# Patient Record
Sex: FEMALE | Race: BLACK OR AFRICAN AMERICAN | NOT HISPANIC OR LATINO | Employment: FULL TIME | URBAN - METROPOLITAN AREA
[De-identification: names, ages, dates, MRNs, and addresses within clinical notes are randomized per-mention and may not be internally consistent; named-entity substitution may affect disease eponyms.]

---

## 2017-01-16 ENCOUNTER — HOSPITAL ENCOUNTER (EMERGENCY)
Facility: OTHER | Age: 27
Discharge: HOME | End: 2017-01-16
Payer: MEDICAID

## 2017-01-16 VITALS
OXYGEN SATURATION: 100 % | SYSTOLIC BLOOD PRESSURE: 114 MMHG | DIASTOLIC BLOOD PRESSURE: 70 MMHG | TEMPERATURE: 99 F | HEART RATE: 68 BPM | RESPIRATION RATE: 18 BRPM

## 2017-01-16 LAB
BASOPHILS # BLD AUTO: 0.01 K/UL
BASOPHILS NFR BLD: 0.2 %
DIFFERENTIAL METHOD: ABNORMAL
EOSINOPHIL # BLD AUTO: 0.1 K/UL
EOSINOPHIL NFR BLD: 1.6 %
ERYTHROCYTE [DISTWIDTH] IN BLOOD BY AUTOMATED COUNT: 12.3 %
HCT VFR BLD AUTO: 37.6 %
HGB BLD-MCNC: 12.1 G/DL
LYMPHOCYTES # BLD AUTO: 3 K/UL
LYMPHOCYTES NFR BLD: 48.2 %
MCH RBC QN AUTO: 27.9 PG
MCHC RBC AUTO-ENTMCNC: 32.2 %
MCV RBC AUTO: 87 FL
MONOCYTES # BLD AUTO: 0.4 K/UL
MONOCYTES NFR BLD: 6 %
NEUTROPHILS # BLD AUTO: 2.7 K/UL
NEUTROPHILS NFR BLD: 43.8 %
PLATELET # BLD AUTO: 237 K/UL
PMV BLD AUTO: 9.4 FL
RBC # BLD AUTO: 4.33 M/UL
WBC # BLD AUTO: 6.18 K/UL

## 2017-01-16 PROCEDURE — 99283 EMERGENCY DEPT VISIT LOW MDM: CPT | Mod: ,,, | Performed by: OBSTETRICS & GYNECOLOGY

## 2017-01-16 PROCEDURE — 85025 COMPLETE CBC W/AUTO DIFF WBC: CPT

## 2017-01-16 PROCEDURE — 99283 EMERGENCY DEPT VISIT LOW MDM: CPT

## 2017-01-16 NOTE — ED AVS SNAPSHOT
[facility]  [address]  [facility] [address]               Lurdes KASIA Shay   2017  6:07 PM   ED    Description:  Female : 1990   Department:  [facility]           Your Care was Coordinated By:     None      Reason for Visit     Vaginal Bleeding           ED Disposition     ED Disposition Condition Comment    Discharge             To Do List           [facility]     [facility] Care Line -  Assistance  Registered nurses in the [facility] [place] provide clinical advisement, health education, appointment booking, and other advisory services.  Call for this free service at [phone].             Medications           Message regarding Medications     Verify the changes and/or additions to your medication regime listed below are the same as discussed with your clinician today.  If any of these changes or additions are incorrect, please notify your healthcare provider.             Verify that the below list of medications is an accurate representation of the medications you are currently taking.  If none reported, the list may be blank. If incorrect, please contact your healthcare provider. Carry this list with you in case of emergency.           Current Medications     acetaminophen (TYLENOL) 325 MG tablet Take 325 mg by mouth every 6 (six) hours as needed for Pain.    butalbital-acetaminophen-caffeine -40 mg (FIORICET) -40 mg per tablet Take 1-2 tablets by mouth every 6 (six) hours as needed for Headaches.    ibuprofen (ADVIL,MOTRIN) 600 MG tablet Take 1 tablet (600 mg total) by mouth every 6 (six) hours as needed (cramping).    norgestimate-ethinyl estradiol (ORTHO-CYCLEN) 0.25-35 mg-mcg per tablet Take 1 tablet by mouth once daily.    oxycodone-acetaminophen (PERCOCET) 5-325 mg per tablet Take 1 tablet by mouth every 4 (four) hours as needed.    prenatal vit#10-iron-FA-omega3 30-1-310.1 mg Cap Take 1  tablet by mouth once daily.           Clinical Reference Information           Your Vitals Were     BP Pulse Temp Resp SpO2       114/70 68 98.6 °F (37 °C) 18 100%       Allergies as of 1/16/2017     No Known Allergies      Immunizations Administered on Date of Encounter - 1/16/2017     None      ED Micro, Lab, POCT     Start Ordered       Status Ordering Provider    01/16/17 1856 01/16/17 1855  CBC auto differential  STAT      In process       ED Imaging Orders     None        Discharge Instructions       Monitor bleeding if saturating more that one pad per hour please contact physician.      [facility] complies with applicable Federal civil rights laws and does not discriminate on the basis of race, color, national origin, age, disability, or sex.        Language Assistance Services     ATTENTION: Language assistance services are available, free of charge. Please call [phone].      ATENCIÓN: Si habla español, tiene a damian disposición servicios gratuitos de asistencia lingüística. Llame al [phone].     CHÚ Ý: N?u b?n nói Ti?ng Vi?t, có các d?ch v? h? tr? ngôn ng? mi?n phí dành cho b?n. G?i s? [phone].

## 2017-01-17 NOTE — ED NOTES
Pt 5 weeks pp vag delivery  States Thursday she thought she started her period but was passing large clots today.  Denies pain, cramping or dizziness.  Dr Vazquez notified

## 2017-01-17 NOTE — ED NOTES
Lab results reviewed with Dr. Vazquez at this time. Discussed with pt and discharge insstructions reviewed.

## 2017-01-17 NOTE — ED PROVIDER NOTES
Encounter Date: 2017       History     Chief Complaint   Patient presents with    Vaginal Bleeding     Review of patient's allergies indicates:  No Known Allergies  HPI Comments: Lurdes Day is a 26 y.o. [id number] 5 weeks s/p  presents complaining of vaginal bleeding. Patient reports postpartum bleeding resolved then resumed on Friday. She believes this is her period; however, states that it is heavier than her past periods. Reports at times soaking through a pad/hour. Reports passing clots 5cm clots. Denies breastfeeding.      The history is provided by the patient.     No past medical history on file.  Past Medical History Pertinent Negatives   Diagnosis Date Noted    Asthma 10/26/2014    Coronary artery disease 2013    Diabetes mellitus 2013    Hypertension 2013     Past Surgical History   Procedure Laterality Date    Hernia repair       Family History   Problem Relation Age of Onset    Colon cancer Neg Hx     Hypertension Neg Hx      Social History   Substance Use Topics    Smoking status: Never Smoker    Smokeless tobacco: Never Used    Alcohol use No     Review of Systems   Constitutional: Negative for fever.   HENT: Negative for sore throat.    Respiratory: Negative for shortness of breath.    Cardiovascular: Negative for chest pain.   Gastrointestinal: Negative for nausea.   Genitourinary: Negative for dysuria.   Musculoskeletal: Negative for back pain.   Skin: Negative for rash.   Neurological: Negative for weakness.   Hematological: Does not bruise/bleed easily.       Physical Exam   Initial Vitals   BP Pulse Resp Temp SpO2   17 1810 17 1810 17 1807 17 1810 17 1810   128/85 77 18 98.6 °F (37 °C) 99 %     Physical Exam    Constitutional: She appears well-developed and well-nourished. No distress.   HENT:   Head: Normocephalic and atraumatic.   Cardiovascular: Normal rate and regular rhythm.   Pulmonary/Chest: No respiratory distress.    Neurological: She is alert and oriented to person, place, and time.   Psychiatric: She has a normal mood and affect.     OB LABOR EXAM:                     Comments: SSE: moderate blood in the vaginal vault, minimal active bleeding from the os  No fundal or adnexal tenderness on bimanual exam, normal size uterus  U/S: 6-7cm uterus, 1cm EMS, no products of conception visualized     Lab Results   Component Value Date    WBC 6.18 2017    HGB 12.1 2017    HCT 37.6 2017    MCV 87 2017     2017         ED Course   Procedures  Labs Reviewed - No data to display          Medical Decision Making:   Initial Assessment:   26 y.o. [id number] 5 weeks s/p  with normal postpartum vaginal bleeding  ED Management:  - SSE and bimanual exam as above  - Bedside ultrasound as above  - CBC: H/h 12.1/37.6   - F/u in 1 week with primary OB as scheduled  - Discharge home with reassurance and precautions                Attending Attestation:   Physician Attestation Statement for Resident:  As the supervising MD   Physician Attestation Statement: I have personally seen and examined this patient.   I agree with the above history. -:   As the supervising MD I agree with the above PE.    As the supervising MD I agree with the above treatment, course, plan, and disposition.   -: Agree with plan for discharge after reassurance for postpartum vaginal bleeding.  I was personally present during the critical portions of the procedure(s) performed by the resident and was immediately available in the ED to provide services and assistance as needed during the entire procedure.  I have reviewed and agree with the residents interpretation of the following: lab data.                    ED Course     Clinical Impression:   Normal postpartum bleeding        Brianne Hayes MD  [phone]

## 2017-09-16 ENCOUNTER — HOSPITAL ENCOUNTER (EMERGENCY)
Facility: OTHER | Age: 27
Discharge: HOME | End: 2017-09-16
Attending: EMERGENCY MEDICINE
Payer: MEDICAID

## 2017-09-16 VITALS
SYSTOLIC BLOOD PRESSURE: 122 MMHG | HEIGHT: 66 IN | RESPIRATION RATE: 18 BRPM | WEIGHT: 145 LBS | BODY MASS INDEX: 23.3 KG/M2 | TEMPERATURE: 99 F | OXYGEN SATURATION: 100 % | HEART RATE: 92 BPM | DIASTOLIC BLOOD PRESSURE: 84 MMHG

## 2017-09-16 DIAGNOSIS — J06.9 UPPER RESPIRATORY TRACT INFECTION, UNSPECIFIED TYPE: ICD-10-CM

## 2017-09-16 DIAGNOSIS — H10.9 CONJUNCTIVITIS OF LEFT EYE, UNSPECIFIED CONJUNCTIVITIS TYPE: Primary | ICD-10-CM

## 2017-09-16 DIAGNOSIS — Z34.90 PREGNANCY, UNSPECIFIED GESTATIONAL AGE: ICD-10-CM

## 2017-09-16 LAB
B-HCG UR QL: POSITIVE
CTP QC/QA: YES
CTP QC/QA: YES
S PYO RRNA THROAT QL PROBE: NEGATIVE

## 2017-09-16 PROCEDURE — 87880 STREP A ASSAY W/OPTIC: CPT

## 2017-09-16 PROCEDURE — 99283 EMERGENCY DEPT VISIT LOW MDM: CPT | Mod: 25

## 2017-09-16 PROCEDURE — 81025 URINE PREGNANCY TEST: CPT | Performed by: NURSE PRACTITIONER

## 2017-09-16 RX ORDER — ERYTHROMYCIN 5 MG/G
OINTMENT OPHTHALMIC
Qty: 3.5 G | Refills: 0 | Status: SHIPPED | OUTPATIENT
Start: 2017-09-16 | End: 2018-02-16

## 2017-09-16 NOTE — ED PROVIDER NOTES
Encounter Date: 9/16/2017       History     Chief Complaint   Patient presents with    Conjunctivitis     redness and drainage to left eye x 4 days    Sore Throat     x 3 days     A 26-year-old female who presents to the ED with left eye redness and drainage ×4 days.  Patient denies visual changes.  States she has been in contact with her son who has pinkeye.  Patient reports sore throat for 4 days and nasal congestion with a mild cough.  Patient denies fever, chills nausea vomiting.  Patient states she is late on her menstrual period and does not know if she is pregnant.  Patient denies abdominal pain or vaginal discharge. LMP around 08/09/2017.          Review of patient's allergies indicates:  No Known Allergies  History reviewed. No pertinent past medical history.  Past Surgical History:   Procedure Laterality Date    HERNIA REPAIR       Family History   Problem Relation Age of Onset    Colon cancer Neg Hx     Hypertension Neg Hx      Social History   Substance Use Topics    Smoking status: Never Smoker    Smokeless tobacco: Never Used    Alcohol use No     Review of Systems   Constitutional: Negative.  Negative for activity change, appetite change and fever.   HENT: Positive for congestion and sore throat. Negative for ear discharge and ear pain.    Eyes: Positive for discharge and redness.   Respiratory: Positive for cough. Negative for chest tightness.    Cardiovascular: Negative for chest pain and palpitations.   Gastrointestinal: Negative.  Negative for abdominal distention, abdominal pain and nausea.   Genitourinary: Negative.  Negative for difficulty urinating, dyspareunia and dysuria.   Musculoskeletal: Negative.  Negative for arthralgias, back pain, neck pain and neck stiffness.   Skin: Negative.  Negative for color change.   Neurological: Negative for dizziness, facial asymmetry and weakness.   Psychiatric/Behavioral: Negative.  Negative for agitation and sleep disturbance.   All other systems  reviewed and are negative.      Physical Exam     Initial Vitals   BP Pulse Resp Temp SpO2   09/16/17 1114 09/16/17 1114 09/16/17 1114 09/16/17 1115 09/16/17 1114   124/81 88 16 99.4 °F (37.4 °C) 99 %      MAP       09/16/17 1114       95.33         Physical Exam    Nursing note and vitals reviewed.  Constitutional: Vital signs are normal. She appears well-developed and well-nourished.  Non-toxic appearance. She does not have a sickly appearance.   HENT:   Head: Normocephalic.   Right Ear: Tympanic membrane and external ear normal.   Left Ear: Tympanic membrane and external ear normal.   Nose: Nose normal.   Mouth/Throat: Uvula is midline and mucous membranes are normal. Posterior oropharyngeal erythema present. No tonsillar abscesses.   Eyes: EOM and lids are normal. Pupils are equal, round, and reactive to light. Left conjunctiva is injected.   No drainage noted    Cardiovascular: Normal rate and regular rhythm.   Pulmonary/Chest: Effort normal and breath sounds normal.   Abdominal: Bowel sounds are normal. There is no CVA tenderness.   Musculoskeletal:   Upper and lower bilateral extremity with full range of motion.   Neurological: She is alert. She has normal strength.   Psychiatric: She has a normal mood and affect. Her speech is normal and behavior is normal. Thought content normal.         ED Course   Procedures  Labs Reviewed   POCT URINE PREGNANCY - Abnormal; Notable for the following:        Result Value    POC Preg Test, Ur Positive (*)     All other components within normal limits   POCT RAPID STREP A             Medical Decision Making:   Initial Assessment:   A 26-year-old nontoxic female who presents to the ED with complaints of left eye redness ×4 days.  Patient has been in contact with her son who has conjunctivitis.  Patient reports upper respiratory symptoms at up as 4 days.  Patient unsure if she is pregnant.  Denies abdominal pain.  She states she has not had a period in almost 2 months.  She  did not take a home pregnancy test because she is not sure if she wants to carry to baby to full-term.  Differential Diagnosis:   Conjunctivitis, upper respiratory infection, strep pharyngitis.  Clinical Tests:   Lab Tests: Ordered and Reviewed  ED Management:  Physical exam, strep swab-negative.  Urine pregnancy test positive.  Patient denies abdominal pain and vaginal bleeding.  Patient to be discharged home with erythromycin ophthalmic ointment.  Patient to follow up with PCP in 2-3 days.  Patient referred to me for follow-up.  Patient instructed to return to ED if symptoms worsen or for abdominal pain or vaginal bleeding.              Attending Attestation:     Physician Attestation Statement for NP/PA:   I discussed this assessment and plan of this patient with the NP/PA, but I did not personally examine the patient. The face to face encounter was performed by the NP/PA.                  ED Course      Clinical Impression:   The primary encounter diagnosis was Conjunctivitis of left eye, unspecified conjunctivitis type. Diagnoses of Upper respiratory tract infection, unspecified type and Pregnancy, unspecified gestational age were also pertinent to this visit.                           ANISA Goodrich  09/16/17 8806       Isabel Koroma MD  09/16/17 3017

## 2017-09-16 NOTE — DISCHARGE INSTRUCTIONS
You have been diagnosed with an early pregnancy. Follow-up with OB in 3-5 days.  Return to ED for abdominal pain or vaginal bleeding.

## 2017-12-13 ENCOUNTER — TELEPHONE (OUTPATIENT)
Dept: OBSTETRICS AND GYNECOLOGY | Facility: CLINIC | Age: 27
End: 2017-12-13

## 2017-12-13 DIAGNOSIS — O09.33 LATE PRENATAL CARE AFFECTING PREGNANCY IN THIRD TRIMESTER: Primary | ICD-10-CM

## 2017-12-13 NOTE — TELEPHONE ENCOUNTER
----- Message from Vannesa Pillai sent at 12/13/2017  1:13 PM CST -----  Contact: pt  _ x 1st Request  _  2nd Request  _  3rd Request      Who:pt    Why: calling in regards to an routine appointment     What Number to Call Back: [phone]    When to Expect a call back: (Before the end of the day)   -- if call after 3:00 call back will be tomorrow.

## 2017-12-13 NOTE — TELEPHONE ENCOUNTER
Returned pt call to office about appointment. Patient was not available and a family member answered and took a message. Provided number to office [phone].

## 2017-12-13 NOTE — TELEPHONE ENCOUNTER
Spoke to patient. Patient states she is having breast tenderness and soreness. Patient is 7 months pregnant with no previous care in this pregnancy. Advised patient that breast soreness is normal at this stage of pregnancy. Scheduled new ob appt.

## 2017-12-13 NOTE — TELEPHONE ENCOUNTER
----- Message from Macey Biswas sent at 12/13/2017  2:22 PM CST -----  Contact: pt  x_ 1st Request  _ 2nd Request  _ 3rd Request    Who: pt    Why: in regards to experiencing swollen breast and tenderness    What Number to Call Back: [phone]    When to Expect a call back: (Before the end of the day)  -- if call after 3:00 call back will be tomorrow.

## 2017-12-29 ENCOUNTER — TELEPHONE (OUTPATIENT)
Dept: OBSTETRICS AND GYNECOLOGY | Facility: CLINIC | Age: 27
End: 2017-12-29

## 2017-12-29 NOTE — TELEPHONE ENCOUNTER
----- Message from Tonio Velasco sent at 12/29/2017 12:55 PM CST -----  Contact: patient  x_ 1st Request   _ 2nd Request   _ 3rd Request     Who: CARLOS VINSON [[id number]]    Why: Pt missed your call is requesting a call back    What Number to Call Back: [phone]    When to Expect a call back: (Before the end of the day)   -- if call after 3:00 call back will be tomorrow.

## 2017-12-29 NOTE — TELEPHONE ENCOUNTER
----- Message from Nia Horne sent at 12/29/2017  9:36 AM CST -----  Contact: Patient   x _1st Request  _  2nd Request  _  3rd Request    Who:CARLOS VINSON [[id number]]    Why:Patient is 28 weeks and she states no one advised her that her appointment  was canceled she took off from work today she is requesting a call back asap     What Number to Call Back:[phone]    When to Expect a call back: (Before the end of the day)   -- if call after 3:00 call back will be tomorrow.

## 2018-01-12 ENCOUNTER — TELEPHONE (OUTPATIENT)
Dept: OBSTETRICS AND GYNECOLOGY | Facility: CLINIC | Age: 28
End: 2018-01-12

## 2018-01-12 NOTE — TELEPHONE ENCOUNTER
Pt scheduled for 01/18/18 at [facility] with Dr Ochoa for routine Ob

## 2018-01-12 NOTE — TELEPHONE ENCOUNTER
----- Message from Vannesa Pillai sent at 1/12/2018  8:55 AM CST -----  Contact: pt  _x  1st Request  _  2nd Request  _  3rd Request      Who:pt    Why: calling in regards to her Routine appointment     What Number to Call Back: [phone]    When to Expect a call back: (Before the end of the day)   -- if call after 3:00 call back will be tomorrow.

## 2018-01-18 ENCOUNTER — PATIENT MESSAGE (OUTPATIENT)
Dept: OBSTETRICS AND GYNECOLOGY | Facility: CLINIC | Age: 28
End: 2018-01-18

## 2018-01-18 ENCOUNTER — ROUTINE PRENATAL (OUTPATIENT)
Dept: OBSTETRICS AND GYNECOLOGY | Facility: CLINIC | Age: 28
End: 2018-01-18
Attending: OBSTETRICS & GYNECOLOGY
Payer: MEDICAID

## 2018-01-18 VITALS — SYSTOLIC BLOOD PRESSURE: 112 MMHG | BODY MASS INDEX: 25.8 KG/M2 | DIASTOLIC BLOOD PRESSURE: 78 MMHG | WEIGHT: 159.81 LBS

## 2018-01-18 DIAGNOSIS — O09.33 LATE PRENATAL CARE AFFECTING PREGNANCY IN THIRD TRIMESTER: Primary | ICD-10-CM

## 2018-01-18 DIAGNOSIS — N89.8 VAGINAL ITCHING: ICD-10-CM

## 2018-01-18 PROCEDURE — 87081 CULTURE SCREEN ONLY: CPT

## 2018-01-18 PROCEDURE — 88175 CYTOPATH C/V AUTO FLUID REDO: CPT

## 2018-01-18 PROCEDURE — 87480 CANDIDA DNA DIR PROBE: CPT

## 2018-01-18 PROCEDURE — 99215 OFFICE O/P EST HI 40 MIN: CPT | Mod: 25,S$PBB,TH, | Performed by: OBSTETRICS & GYNECOLOGY

## 2018-01-18 PROCEDURE — 99213 OFFICE O/P EST LOW 20 MIN: CPT | Mod: PBBFAC,TH | Performed by: OBSTETRICS & GYNECOLOGY

## 2018-01-18 PROCEDURE — 87491 CHLMYD TRACH DNA AMP PROBE: CPT

## 2018-01-18 PROCEDURE — 99999 PR PBB SHADOW E&M-EST. PATIENT-LVL III: CPT | Mod: PBBFAC,,, | Performed by: OBSTETRICS & GYNECOLOGY

## 2018-01-18 NOTE — PROGRESS NOTES
HPI: Pt is a 27 y.o. female who presents complaining of missed menses and (+) home UPT. She denies vaginal bleeding or abdominal pain. She does not have nausea and vomiting. No prenatal care this pregnancy and believes her LMP to be 17 with first missed period in may.  States that she has not had time to come in.  History of  x 3, loss of 1 child to SIDS.      ROS:  GENERAL: Feeling well overall.   SKIN: Denies rash or lesions.   HEAD: Denies head injury or headache.   NODES: Denies enlarged lymph nodes.   CHEST: Denies chest pain or shortness of breath.   CARDIOVASCULAR: Denies palpitations or left sided chest pain.   ABDOMEN: No abdominal pain, constipation, diarrhea or rectal bleeding.   URINARY: No dysuria, hematuria, or burning on urination.  REPRODUCTIVE: See HPI.   BREASTS: Denies pain, lumps, or nipple discharge.   HEMATOLOGIC: No easy bruisability or excessive bleeding.   MUSCULOSKELETAL: Denies joint pain or swelling.   NEUROLOGIC: Denies syncope or weakness.   PSYCHIATRIC: Denies depression, anxiety or mood swings.    PE:   APPEARANCE: Well nourished, well developed, in no acute distress.  AFFECT: WNL, alert and oriented x 3.  SKIN: No acne or hirsutism.  NECK: Neck symmetric, without masses or thyromegaly.  NODES: No inguinal, cervical, axillary or femoral lymph node enlargement.  CHEST: Good respiratory effort.   ABDOMEN: Soft. No tenderness or masses. No hepatosplenomegaly. No hernias.  BREASTS: Symmetrical, no skin changes or visible lesions. No palpable masses, adenopathy, or nipple discharge bilaterally.  PELVIC: External female genitalia without lesions. Female hair distribution. Adequate perineal body, Normal urethral meatus. Vagina moist and well rugated without lesions or discharge. There is no significant cystocele or rectocele. Cervix pink without lesions, discharge or tenderness. Uterus is 33 week size, regular, mobile and nontender. Adnexa without masses.  EXTREMITIES: No  edema.    PROCEDURES:  - UPT: positive  - Urine dip: negative  - Dating U/S: to be scheduled with GynUS    GROWTH U/S: 31w4d with EDC of 3/18/18, MVP 3.8cm, placenta posterior      Diagnosis:  1. Late prenatal care affecting pregnancy in third trimester    2. Vaginal itching        Plan:     Orders Placed This Encounter    Urine culture    C. trachomatis/N. gonorrhoeae by AMP DNA Cervix    Strep B Screen, Vaginal / Rectal    Vaginosis Screen by DNA Probe    Tdap Vaccine    Basic metabolic panel    HIV-1 and HIV-2 antibodies    RPR    Hepatitis B surface antigen    Rubella antibody, IgG    CBC auto differential    OB Glucose Screen    Type & Screen - Ob Profile    Liquid-based pap smear, screening    US OB/GYN Procedure (Viewpoint)         Patient was counseled today on routine precautions, including vaginal bleeding and abdominal pain. We also discussed proper weight gain based on the [place] [employer] [facility]'s recommendations based on her pre-pregnancy weight, foods to avoid in pregnancy (i.e. sushi, fish that are high in mercury, cold deli meat, and unpasteurized cheeses), environmental precautions such as cat litter, and prenatal vitamin options (i.e. stool softener, DHA).     Third trimester precautions given for signs of labor, ROM, decreased fetal movement with instructions to come in if experiences any of these things.    All appointments scheduled for TDAP, labs, glucose screen and dating/ anatomy ordered with M.    Total face to face time spent with the patient was 40 minutes and over half spent in counseling on the above related issues.     Follow-up with me in 1 week for OB check

## 2018-01-19 ENCOUNTER — PATIENT MESSAGE (OUTPATIENT)
Dept: MATERNAL FETAL MEDICINE | Facility: CLINIC | Age: 28
End: 2018-01-19

## 2018-01-19 LAB
C TRACH DNA SPEC QL NAA+PROBE: NOT DETECTED
CANDIDA RRNA VAG QL PROBE: POSITIVE
G VAGINALIS RRNA GENITAL QL PROBE: NEGATIVE
N GONORRHOEA DNA SPEC QL NAA+PROBE: NOT DETECTED
T VAGINALIS RRNA GENITAL QL PROBE: NEGATIVE

## 2018-01-22 LAB — BACTERIA SPEC AEROBE CULT: NORMAL

## 2018-01-23 ENCOUNTER — PATIENT MESSAGE (OUTPATIENT)
Dept: OBSTETRICS AND GYNECOLOGY | Facility: CLINIC | Age: 28
End: 2018-01-23

## 2018-01-23 RX ORDER — TERCONAZOLE 4 MG/G
1 CREAM VAGINAL NIGHTLY
Qty: 45 G | Refills: 0 | Status: SHIPPED | OUTPATIENT
Start: 2018-01-23 | End: 2018-01-26 | Stop reason: SDUPTHER

## 2018-01-24 ENCOUNTER — TELEPHONE (OUTPATIENT)
Dept: OBSTETRICS AND GYNECOLOGY | Facility: HOSPITAL | Age: 28
End: 2018-01-24

## 2018-01-24 DIAGNOSIS — O09.30 LATE PRENATAL CARE: Primary | ICD-10-CM

## 2018-01-25 NOTE — TELEPHONE ENCOUNTER
----- Message from Nguyễn Stevens RN sent at 1/19/2018  8:08 AM CST -----  Calling pt now, can you enter [facility] u/s order - OB view 55  ----- Message -----  From: Sangita Ochoa DO  Sent: 1/18/2018   5:37 PM  To: MARCELINO Earl,   This patient just presented for her first prenatal visit at 38w by LMP but measuring 33 by fundal height and 31w4d by quick scan in the office.  Talked to Karla, he said try to get her in ASAP.  Thanks in advance for your help.  Axel Ochoa

## 2018-01-26 ENCOUNTER — ROUTINE PRENATAL (OUTPATIENT)
Dept: OBSTETRICS AND GYNECOLOGY | Facility: CLINIC | Age: 28
End: 2018-01-26
Attending: OBSTETRICS & GYNECOLOGY
Payer: MEDICAID

## 2018-01-26 ENCOUNTER — OFFICE VISIT (OUTPATIENT)
Dept: MATERNAL FETAL MEDICINE | Facility: CLINIC | Age: 28
End: 2018-01-26
Payer: MEDICAID

## 2018-01-26 VITALS
SYSTOLIC BLOOD PRESSURE: 122 MMHG | WEIGHT: 160.94 LBS | BODY MASS INDEX: 25.98 KG/M2 | DIASTOLIC BLOOD PRESSURE: 84 MMHG

## 2018-01-26 DIAGNOSIS — Z34.93 PREGNANCY WITH ONE FETUS IN THIRD TRIMESTER: Primary | ICD-10-CM

## 2018-01-26 DIAGNOSIS — O09.30 LATE PRENATAL CARE: ICD-10-CM

## 2018-01-26 DIAGNOSIS — Z36.89 ENCOUNTER FOR ULTRASOUND TO CHECK FETAL GROWTH: Primary | ICD-10-CM

## 2018-01-26 DIAGNOSIS — Z36.89 ENCOUNTER FOR FETAL ANATOMIC SURVEY: ICD-10-CM

## 2018-01-26 PROCEDURE — 99213 OFFICE O/P EST LOW 20 MIN: CPT | Mod: TH,S$PBB,, | Performed by: OBSTETRICS & GYNECOLOGY

## 2018-01-26 PROCEDURE — 99212 OFFICE O/P EST SF 10 MIN: CPT | Mod: PBBFAC,25,TH | Performed by: OBSTETRICS & GYNECOLOGY

## 2018-01-26 PROCEDURE — 3008F BODY MASS INDEX DOCD: CPT | Mod: ,,, | Performed by: OBSTETRICS & GYNECOLOGY

## 2018-01-26 PROCEDURE — 76805 OB US >/= 14 WKS SNGL FETUS: CPT | Mod: 26,S$PBB,, | Performed by: OBSTETRICS & GYNECOLOGY

## 2018-01-26 PROCEDURE — 99999 PR PBB SHADOW E&M-EST. PATIENT-LVL II: CPT | Mod: PBBFAC,,, | Performed by: OBSTETRICS & GYNECOLOGY

## 2018-01-26 PROCEDURE — 99499 UNLISTED E&M SERVICE: CPT | Mod: S$PBB,,, | Performed by: OBSTETRICS & GYNECOLOGY

## 2018-01-26 PROCEDURE — 76805 OB US >/= 14 WKS SNGL FETUS: CPT | Mod: PBBFAC | Performed by: OBSTETRICS & GYNECOLOGY

## 2018-01-26 RX ORDER — TERCONAZOLE 4 MG/G
1 CREAM VAGINAL NIGHTLY
Qty: 45 G | Refills: 0 | Status: SHIPPED | OUTPATIENT
Start: 2018-01-26 | End: 2018-02-02

## 2018-01-26 NOTE — LETTER
January 26, 2018      Sangita Ochoa,   [address] [address]           [facility]  [address]  [facility] [address]  Phone: [phone]          Patient: Lurdes Day   MR Number: [id number]   YOB: 1990   Date of Visit: 1/26/2018       Dear Dr. Sangita Ochoa:    Thank you for referring Lurdes Day to me for evaluation. Attached you will find relevant portions of my assessment and plan of care.    If you have questions, please do not hesitate to call me. I look forward to following Lurdes Day along with you.    Sincerely,    Franchesca Vázquez MD    Enclosure  CC:  No Recipients    If you would like to receive this communication electronically, please contact [email or web address] or [phone] to request more information on [employer] access.    For providers and/or their staff who would like to refer a patient to [facility], please contact us through our one-stop-shop provider referral line, [facility], at [phone].    If you feel you have received this communication in error or would no longer like to receive these types of communications, please e-mail [email or web address]

## 2018-02-09 ENCOUNTER — TELEPHONE (OUTPATIENT)
Dept: OBSTETRICS AND GYNECOLOGY | Facility: CLINIC | Age: 28
End: 2018-02-09

## 2018-02-09 NOTE — TELEPHONE ENCOUNTER
Spoke with patient. Pt advised  is out of the office. Advised no avail appt's open for booking prior to 2/16. Offered to leave a msg for her nurse. Pt states that is okay she will keep the scheduled appt    Voiced understanding

## 2018-02-09 NOTE — TELEPHONE ENCOUNTER
----- Message from Macey Bsiwas sent at 2/9/2018  3:05 PM CST -----  Contact: Pt   x_ 1st Request  _ 2nd Request  _ 3rd Request    Who: pt    Why: is needing clarity on why her appointment was cancelled. Pt is needing to speak with staff in regards to coming in to be seen before 02/16/18.    What Number to Call Back: [phone]    When to Expect a call back: (Before the end of the day)  -- if call after 3:00 call back will be tomorrow.

## 2018-02-16 ENCOUNTER — ROUTINE PRENATAL (OUTPATIENT)
Dept: OBSTETRICS AND GYNECOLOGY | Facility: CLINIC | Age: 28
End: 2018-02-16
Attending: OBSTETRICS & GYNECOLOGY
Payer: MEDICAID

## 2018-02-16 VITALS — SYSTOLIC BLOOD PRESSURE: 116 MMHG | BODY MASS INDEX: 26.3 KG/M2 | DIASTOLIC BLOOD PRESSURE: 80 MMHG | WEIGHT: 162.94 LBS

## 2018-02-16 DIAGNOSIS — O09.30 LATE PRENATAL CARE: ICD-10-CM

## 2018-02-16 PROCEDURE — 3008F BODY MASS INDEX DOCD: CPT | Mod: S$GLB,,, | Performed by: OBSTETRICS & GYNECOLOGY

## 2018-02-16 PROCEDURE — 99213 OFFICE O/P EST LOW 20 MIN: CPT | Mod: TH,S$GLB,, | Performed by: OBSTETRICS & GYNECOLOGY

## 2018-02-16 NOTE — PROGRESS NOTES
Patient continues to be non-compliant about getting prenatal labs and completing glucose and TDAP.  Discussed again today that these need to get done ASAP, states she will take care of this on Monday as she expects to take leave from her job at that point.  All labs, injections, glucose screen scheduled for Monday 19th, patient states she will come to get all of these done.  F/U scheduled 2/22 after ultrasound with MFM.  Discussed contraception again, will likely get a depo shot prior to starting ortho evra 6 weeks pp.  Declines BTL or LARC methods.

## 2018-02-19 ENCOUNTER — LAB VISIT (OUTPATIENT)
Dept: LAB | Facility: OTHER | Age: 28
End: 2018-02-19
Attending: OBSTETRICS & GYNECOLOGY
Payer: MEDICAID

## 2018-02-19 DIAGNOSIS — O09.30 LATE PRENATAL CARE: ICD-10-CM

## 2018-02-19 DIAGNOSIS — Z34.93 PREGNANCY WITH ONE FETUS IN THIRD TRIMESTER: ICD-10-CM

## 2018-02-19 LAB
ABO + RH BLD: NORMAL
ANION GAP SERPL CALC-SCNC: 7 MMOL/L
BASOPHILS # BLD AUTO: 0.01 K/UL
BASOPHILS NFR BLD: 0.1 %
BLD GP AB SCN CELLS X3 SERPL QL: NORMAL
BUN SERPL-MCNC: 5 MG/DL
CALCIUM SERPL-MCNC: 9 MG/DL
CHLORIDE SERPL-SCNC: 105 MMOL/L
CO2 SERPL-SCNC: 26 MMOL/L
CREAT SERPL-MCNC: 0.7 MG/DL
DIFFERENTIAL METHOD: ABNORMAL
EOSINOPHIL # BLD AUTO: 0 K/UL
EOSINOPHIL NFR BLD: 0.2 %
ERYTHROCYTE [DISTWIDTH] IN BLOOD BY AUTOMATED COUNT: 15.2 %
EST. GFR  (AFRICAN AMERICAN): >60 ML/MIN/1.73 M^2
EST. GFR  (NON AFRICAN AMERICAN): >60 ML/MIN/1.73 M^2
GLUCOSE SERPL-MCNC: 100 MG/DL
GLUCOSE SERPL-MCNC: 99 MG/DL
HCT VFR BLD AUTO: 32.8 %
HGB BLD-MCNC: 10.3 G/DL
LYMPHOCYTES # BLD AUTO: 2.5 K/UL
LYMPHOCYTES NFR BLD: 27.9 %
MCH RBC QN AUTO: 24.2 PG
MCHC RBC AUTO-ENTMCNC: 31.4 G/DL
MCV RBC AUTO: 77 FL
MONOCYTES # BLD AUTO: 0.5 K/UL
MONOCYTES NFR BLD: 5.8 %
NEUTROPHILS # BLD AUTO: 5.8 K/UL
NEUTROPHILS NFR BLD: 65.7 %
PLATELET # BLD AUTO: 244 K/UL
PMV BLD AUTO: 10.2 FL
POTASSIUM SERPL-SCNC: 3.2 MMOL/L
RBC # BLD AUTO: 4.25 M/UL
SODIUM SERPL-SCNC: 138 MMOL/L
WBC # BLD AUTO: 8.83 K/UL

## 2018-02-19 PROCEDURE — 82950 GLUCOSE TEST: CPT

## 2018-02-19 PROCEDURE — 86762 RUBELLA ANTIBODY: CPT

## 2018-02-19 PROCEDURE — 83036 HEMOGLOBIN GLYCOSYLATED A1C: CPT

## 2018-02-19 PROCEDURE — 80048 BASIC METABOLIC PNL TOTAL CA: CPT

## 2018-02-19 PROCEDURE — 80074 ACUTE HEPATITIS PANEL: CPT

## 2018-02-19 PROCEDURE — 36415 COLL VENOUS BLD VENIPUNCTURE: CPT

## 2018-02-19 PROCEDURE — 85025 COMPLETE CBC W/AUTO DIFF WBC: CPT

## 2018-02-19 PROCEDURE — 86703 HIV-1/HIV-2 1 RESULT ANTBDY: CPT

## 2018-02-19 PROCEDURE — 86901 BLOOD TYPING SEROLOGIC RH(D): CPT

## 2018-02-19 PROCEDURE — 86592 SYPHILIS TEST NON-TREP QUAL: CPT

## 2018-02-20 ENCOUNTER — HOSPITAL ENCOUNTER (EMERGENCY)
Facility: OTHER | Age: 28
Discharge: HOME | End: 2018-02-20
Attending: OBSTETRICS & GYNECOLOGY
Payer: MEDICAID

## 2018-02-20 VITALS
TEMPERATURE: 99 F | RESPIRATION RATE: 16 BRPM | OXYGEN SATURATION: 100 % | DIASTOLIC BLOOD PRESSURE: 77 MMHG | SYSTOLIC BLOOD PRESSURE: 116 MMHG | HEART RATE: 77 BPM

## 2018-02-20 DIAGNOSIS — Z3A.36 36 WEEKS GESTATION OF PREGNANCY: ICD-10-CM

## 2018-02-20 DIAGNOSIS — O47.9 UTERINE CONTRACTIONS DURING PREGNANCY: Primary | ICD-10-CM

## 2018-02-20 DIAGNOSIS — O09.33 LATE PRENATAL CARE AFFECTING PREGNANCY IN THIRD TRIMESTER: ICD-10-CM

## 2018-02-20 LAB
ESTIMATED AVG GLUCOSE: 97 MG/DL
HAV IGM SERPL QL IA: NEGATIVE
HBA1C MFR BLD HPLC: 5 %
HBV CORE IGM SERPL QL IA: NEGATIVE
HBV SURFACE AG SERPL QL IA: NEGATIVE
HCV AB SERPL QL IA: NEGATIVE
HIV 1+2 AB+HIV1 P24 AG SERPL QL IA: NEGATIVE
RPR SER QL: NORMAL
RUBV IGG SER-ACNC: 40.3 IU/ML
RUBV IGG SER-IMP: REACTIVE

## 2018-02-20 PROCEDURE — 59025 FETAL NON-STRESS TEST: CPT

## 2018-02-20 PROCEDURE — 99284 EMERGENCY DEPT VISIT MOD MDM: CPT | Mod: 25,,, | Performed by: OBSTETRICS & GYNECOLOGY

## 2018-02-20 PROCEDURE — 99284 EMERGENCY DEPT VISIT MOD MDM: CPT | Mod: 25

## 2018-02-20 PROCEDURE — 59025 FETAL NON-STRESS TEST: CPT | Mod: 26,,, | Performed by: OBSTETRICS & GYNECOLOGY

## 2018-02-20 NOTE — ED PROVIDER NOTES
Encounter Date: 2018       History     Chief Complaint   Patient presents with    Contractions     Lurdes Day is a 27 y.o. [id number] at 36w6d presents complaining of contractions every 3-7 mins since this morning. States contractions are not painful.   This IUP is complicated by poor dates (dating by 32 wk scan), late prenatal care, anemia, and multiparity.  Patient reports contractions, denies vaginal bleeding, denies LOF.   Fetal Movement: normal.            Review of patient's allergies indicates:  No Known Allergies  History reviewed. No pertinent past medical history.  Past Surgical History:   Procedure Laterality Date    HERNIA REPAIR       Family History   Problem Relation Age of Onset    Colon cancer Neg Hx     Hypertension Neg Hx      Social History   Substance Use Topics    Smoking status: Never Smoker    Smokeless tobacco: Never Used    Alcohol use No     Review of Systems   Constitutional: Negative for chills, fatigue and fever.   HENT: Negative for congestion.    Eyes: Negative for visual disturbance.   Respiratory: Negative for cough and shortness of breath.    Cardiovascular: Negative for chest pain and palpitations.   Gastrointestinal: Positive for abdominal pain. Negative for abdominal distention, constipation, diarrhea, nausea and vomiting.   Genitourinary: Negative for difficulty urinating, dysuria, hematuria, vaginal bleeding and vaginal discharge.   Skin: Negative for rash.   Neurological: Negative for dizziness, seizures, light-headedness and headaches.   Hematological: Does not bruise/bleed easily.   Psychiatric/Behavioral: Negative for dysphoric mood. The patient is not nervous/anxious.        Physical Exam     Initial Vitals   BP Pulse Resp Temp SpO2   --116/77 --67 -- --98.9 --100      MAP       --       Temp:  [98.9 °F (37.2 °C)] 98.9 °F (37.2 °C)  Pulse:  [67-91] 77  Resp:  [16] 16  SpO2:  [99 %-100 %] 100 %  BP: (116-128)/(77-86) 116/77    Physical Exam    Vitals  reviewed.  Constitutional: She appears well-developed and well-nourished.   Cardiovascular: Normal rate and regular rhythm.   Pulmonary/Chest: No respiratory distress.   Abdominal: Soft. She exhibits no distension. There is no tenderness.   Gravid     Musculoskeletal: She exhibits no edema.   Neurological: She is alert and oriented to person, place, and time.   Skin: Skin is warm and dry.   Psychiatric: She has a normal mood and affect.     OB LABOR EXAM:   Pre-Term Labor: No.   Membranes ruptured: No.   Method: Sterile vaginal exam per MD.   Vaginal Bleeding: none present.     Dilatation: 2.   Station: -3.   Effacement: 60%.             ED Course   Obtain Fetal nonstress test (NST)  Date/Time: 2/20/2018 2:27 PM  Performed by: VINCENT CASTRO  Authorized by: VINCENT CASTRO     Nonstress Test:     Variability:  6-25 BPM    Decelerations:  None    Accelerations:  15 bpm    Acoustic Stimulator: No      Baseline:  135    Contractions:  Regular    Contraction Frequency:  2-4  Biophysical Profile:     Nonstress Test Interpretation: reactive        Labs Reviewed - No data to display          Medical Decision Making:   ED Management:  - Cervical exam unchanged from clinic at 2/60/-3  - NST reactive and reassuring ctx every 2-3 mins. Baseline 135 +acels   - Recheck 2 hours later showed patient was unchanged at 2/60/-3  -  Patient states contractions are the same and not painful  - Discussed ED and labor precautions  - patient to keep apt with Dr. Ochoa on Thursday 2/22  - All questions answered               Attending Attestation:   Physician Attestation Statement for Resident:  As the supervising MD   Physician Attestation Statement: I have personally seen and examined this patient.   I agree with the above history. -:   As the supervising MD I agree with the above PE.    As the supervising MD I agree with the above treatment, course, plan, and disposition.   -:   NST  I independently reviewed the fetal non-stress test with  the following interpretation:  135 BPM baseline  Variability: moderate  Accelerations: present  Decelerations: absent  Contractions: Q 2-4 min  Category 1    Clinical Interpretation:reactive    Patient evaluated and found to be stable, agree with resident's assessment of false labor and plan to discharge with instructions to return for s/s active labor.  I was personally present during the critical portions of the procedure(s) performed by the resident and was immediately available in the ED to provide services and assistance as needed during the entire procedure.  I have reviewed the following: old records at this facility.                       Clinical Impression:   The encounter diagnosis was Uterine contractions during pregnancy.                           Ashley Guzmán MD  Resident  02/20/18 1625       Brianne Hayes MD  02/20/18 1647

## 2018-02-20 NOTE — DISCHARGE INSTRUCTIONS
Call clinic [phone] or L & D after hours at [phone] for vaginal bleeding, leakage of fluids, regular contractions every 5 mins for 2 hours, decreased fetal movements ( 10 kicks in 2 hours), headache not relieved by Tylenol, blurry vision, or temp of 100.4 or greater.  Begin doing fetal kick counts, at least 10 movements in 2 hours starting at 28 weeks gestation.  Keep next clinic appointment

## 2018-02-20 NOTE — Clinical Note
Comes to [facility] with ctx every 3-7 minutes. They are not painful, she just feels tightening. +FM. Denies lof/vb.

## 2018-02-20 NOTE — ED NOTES
NST done, strip is reactive. VSS. She feels tightening every 2-3 minutes but they are not painful. No cervical change after 2 hours: 2.5/60/-3. Discharge instructions given and pt verbalized understanding.

## 2018-02-22 ENCOUNTER — OFFICE VISIT (OUTPATIENT)
Dept: MATERNAL FETAL MEDICINE | Facility: CLINIC | Age: 28
End: 2018-02-22
Attending: OBSTETRICS & GYNECOLOGY
Payer: MEDICAID

## 2018-02-22 ENCOUNTER — ROUTINE PRENATAL (OUTPATIENT)
Dept: OBSTETRICS AND GYNECOLOGY | Facility: CLINIC | Age: 28
End: 2018-02-22
Attending: OBSTETRICS & GYNECOLOGY
Payer: MEDICAID

## 2018-02-22 ENCOUNTER — CLINICAL SUPPORT (OUTPATIENT)
Dept: OBSTETRICS AND GYNECOLOGY | Facility: CLINIC | Age: 28
End: 2018-02-22
Payer: MEDICAID

## 2018-02-22 VITALS
DIASTOLIC BLOOD PRESSURE: 70 MMHG | WEIGHT: 163.81 LBS | SYSTOLIC BLOOD PRESSURE: 124 MMHG | BODY MASS INDEX: 26.44 KG/M2

## 2018-02-22 DIAGNOSIS — Z36.89 ENCOUNTER FOR ULTRASOUND TO CHECK FETAL GROWTH: ICD-10-CM

## 2018-02-22 DIAGNOSIS — O09.30 LATE PRENATAL CARE: Primary | ICD-10-CM

## 2018-02-22 DIAGNOSIS — Z34.93 PREGNANCY WITH ONE FETUS IN THIRD TRIMESTER: ICD-10-CM

## 2018-02-22 DIAGNOSIS — O09.30 LATE PRENATAL CARE: ICD-10-CM

## 2018-02-22 PROCEDURE — 99999 PR PBB SHADOW E&M-EST. PATIENT-LVL I: CPT | Mod: PBBFAC,,,

## 2018-02-22 PROCEDURE — 3008F BODY MASS INDEX DOCD: CPT | Mod: ,,, | Performed by: OBSTETRICS & GYNECOLOGY

## 2018-02-22 PROCEDURE — 99213 OFFICE O/P EST LOW 20 MIN: CPT | Mod: TH,S$PBB,, | Performed by: OBSTETRICS & GYNECOLOGY

## 2018-02-22 PROCEDURE — 90471 IMMUNIZATION ADMIN: CPT | Mod: PBBFAC

## 2018-02-22 PROCEDURE — 76816 OB US FOLLOW-UP PER FETUS: CPT | Mod: PBBFAC | Performed by: OBSTETRICS & GYNECOLOGY

## 2018-02-22 PROCEDURE — 99999 PR PBB SHADOW E&M-EST. PATIENT-LVL II: CPT | Mod: PBBFAC,,, | Performed by: OBSTETRICS & GYNECOLOGY

## 2018-02-22 PROCEDURE — 76816 OB US FOLLOW-UP PER FETUS: CPT | Mod: 26,S$PBB,, | Performed by: OBSTETRICS & GYNECOLOGY

## 2018-02-22 PROCEDURE — 99211 OFF/OP EST MAY X REQ PHY/QHP: CPT | Mod: PBBFAC,27,TH

## 2018-02-22 PROCEDURE — 99499 UNLISTED E&M SERVICE: CPT | Mod: S$PBB,,, | Performed by: OBSTETRICS & GYNECOLOGY

## 2018-02-22 PROCEDURE — 99211 OFF/OP EST MAY X REQ PHY/QHP: CPT | Mod: PBBFAC

## 2018-02-22 PROCEDURE — 99212 OFFICE O/P EST SF 10 MIN: CPT | Mod: PBBFAC,27,TH,25 | Performed by: OBSTETRICS & GYNECOLOGY

## 2018-02-22 NOTE — PROGRESS NOTES
Patient seen and examined.  No complaints, denies VB/LOF/Ctxns, reports good fetal movement. Precautions for Bleeding/ ROM/ Decreased fetal movement/ Pre-E reviewed.  F/U scheduled 1 weeks  Labs done and all look good, s/p tdap and flu

## 2018-02-25 NOTE — PROGRESS NOTES
Patient seen and examined.  No complaints, denies VB/LOF/Ctxns, reports good fetal movement. Precautions for Bleeding/ ROM/ Decreased fetal movement/ Pre-E reviewed.  Encouraged patient to get labs drawn and appropriate injections, also discussed contraception  F/U scheduled 2 weeks

## 2018-03-01 ENCOUNTER — ROUTINE PRENATAL (OUTPATIENT)
Dept: OBSTETRICS AND GYNECOLOGY | Facility: CLINIC | Age: 28
End: 2018-03-01
Attending: OBSTETRICS & GYNECOLOGY
Payer: MEDICAID

## 2018-03-01 VITALS
DIASTOLIC BLOOD PRESSURE: 70 MMHG | BODY MASS INDEX: 26.87 KG/M2 | SYSTOLIC BLOOD PRESSURE: 112 MMHG | WEIGHT: 166.44 LBS

## 2018-03-01 DIAGNOSIS — O09.30 LATE PRENATAL CARE: Primary | ICD-10-CM

## 2018-03-01 PROCEDURE — 99213 OFFICE O/P EST LOW 20 MIN: CPT | Mod: TH,S$PBB,, | Performed by: OBSTETRICS & GYNECOLOGY

## 2018-03-01 PROCEDURE — 99212 OFFICE O/P EST SF 10 MIN: CPT | Mod: PBBFAC,TH | Performed by: OBSTETRICS & GYNECOLOGY

## 2018-03-01 PROCEDURE — 99999 PR PBB SHADOW E&M-EST. PATIENT-LVL II: CPT | Mod: PBBFAC,,, | Performed by: OBSTETRICS & GYNECOLOGY

## 2018-03-01 NOTE — PROGRESS NOTES
Patient seen and examined.  No complaints, denies VB/LOF/ reports Ctxns that are irregular, reports good fetal movement.   Precautions for Bleeding/ ROM/ Decreased fetal movement/ Pre-E reviewed.  PNT next week due to poor dates.  F/U scheduled 1 weeks

## 2018-03-04 ENCOUNTER — ANESTHESIA EVENT (OUTPATIENT)
Dept: OBSTETRICS AND GYNECOLOGY | Facility: OTHER | Age: 28
End: 2018-03-04
Payer: MEDICAID

## 2018-03-04 ENCOUNTER — HOSPITAL ENCOUNTER (INPATIENT)
Facility: OTHER | Age: 28
LOS: 2 days | Discharge: HOME | End: 2018-03-06
Attending: OBSTETRICS & GYNECOLOGY | Admitting: OBSTETRICS & GYNECOLOGY
Payer: MEDICAID

## 2018-03-04 ENCOUNTER — ANESTHESIA (OUTPATIENT)
Dept: OBSTETRICS AND GYNECOLOGY | Facility: OTHER | Age: 28
End: 2018-03-04
Payer: MEDICAID

## 2018-03-04 DIAGNOSIS — Z3A.38 38 WEEKS GESTATION OF PREGNANCY: ICD-10-CM

## 2018-03-04 LAB
ABO + RH BLD: NORMAL
BASOPHILS # BLD AUTO: 0.01 K/UL
BASOPHILS NFR BLD: 0.1 %
BLD GP AB SCN CELLS X3 SERPL QL: NORMAL
DIFFERENTIAL METHOD: ABNORMAL
EOSINOPHIL # BLD AUTO: 0 K/UL
EOSINOPHIL NFR BLD: 0.3 %
ERYTHROCYTE [DISTWIDTH] IN BLOOD BY AUTOMATED COUNT: 15.3 %
HCT VFR BLD AUTO: 31.4 %
HGB BLD-MCNC: 9.8 G/DL
LYMPHOCYTES # BLD AUTO: 4.5 K/UL
LYMPHOCYTES NFR BLD: 37.5 %
MCH RBC QN AUTO: 23.9 PG
MCHC RBC AUTO-ENTMCNC: 31.2 G/DL
MCV RBC AUTO: 77 FL
MONOCYTES # BLD AUTO: 0.9 K/UL
MONOCYTES NFR BLD: 7.7 %
NEUTROPHILS # BLD AUTO: 6.4 K/UL
NEUTROPHILS NFR BLD: 54.1 %
PLATELET # BLD AUTO: 271 K/UL
PMV BLD AUTO: 10.1 FL
RBC # BLD AUTO: 4.1 M/UL
WBC # BLD AUTO: 11.86 K/UL

## 2018-03-04 PROCEDURE — 11000001 HC ACUTE MED/SURG PRIVATE ROOM

## 2018-03-04 PROCEDURE — 99283 EMERGENCY DEPT VISIT LOW MDM: CPT | Mod: ,,, | Performed by: OBSTETRICS & GYNECOLOGY

## 2018-03-04 PROCEDURE — 25000003 PHARM REV CODE 250: Performed by: OBSTETRICS & GYNECOLOGY

## 2018-03-04 PROCEDURE — 0HQ9XZZ REPAIR PERINEUM SKIN, EXTERNAL APPROACH: ICD-10-PCS | Performed by: OBSTETRICS & GYNECOLOGY

## 2018-03-04 PROCEDURE — 63600175 PHARM REV CODE 636 W HCPCS: Performed by: OBSTETRICS & GYNECOLOGY

## 2018-03-04 PROCEDURE — 86850 RBC ANTIBODY SCREEN: CPT

## 2018-03-04 PROCEDURE — 72200004 HC VAGINAL DELIVERY LEVEL I

## 2018-03-04 PROCEDURE — 25000003 PHARM REV CODE 250: Performed by: STUDENT IN AN ORGANIZED HEALTH CARE EDUCATION/TRAINING PROGRAM

## 2018-03-04 PROCEDURE — 27800517 HC TRAY,EPIDURAL-CONTINUOUS: Performed by: STUDENT IN AN ORGANIZED HEALTH CARE EDUCATION/TRAINING PROGRAM

## 2018-03-04 PROCEDURE — 62326 NJX INTERLAMINAR LMBR/SAC: CPT | Performed by: STUDENT IN AN ORGANIZED HEALTH CARE EDUCATION/TRAINING PROGRAM

## 2018-03-04 PROCEDURE — 51702 INSERT TEMP BLADDER CATH: CPT

## 2018-03-04 PROCEDURE — 59409 OBSTETRICAL CARE: CPT | Mod: AA,,, | Performed by: ANESTHESIOLOGY

## 2018-03-04 PROCEDURE — 10907ZC DRAINAGE OF AMNIOTIC FLUID, THERAPEUTIC FROM PRODUCTS OF CONCEPTION, VIA NATURAL OR ARTIFICIAL OPENING: ICD-10-PCS | Performed by: OBSTETRICS & GYNECOLOGY

## 2018-03-04 PROCEDURE — 99285 EMERGENCY DEPT VISIT HI MDM: CPT

## 2018-03-04 PROCEDURE — 27200710 HC EPIDURAL INFUSION PUMP SET: Performed by: STUDENT IN AN ORGANIZED HEALTH CARE EDUCATION/TRAINING PROGRAM

## 2018-03-04 PROCEDURE — 59409 OBSTETRICAL CARE: CPT | Mod: AT,,, | Performed by: OBSTETRICS & GYNECOLOGY

## 2018-03-04 PROCEDURE — 85025 COMPLETE CBC W/AUTO DIFF WBC: CPT

## 2018-03-04 RX ORDER — ONDANSETRON 8 MG/1
8 TABLET, ORALLY DISINTEGRATING ORAL EVERY 8 HOURS PRN
Status: DISCONTINUED | OUTPATIENT
Start: 2018-03-04 | End: 2018-03-06 | Stop reason: HOSPADM

## 2018-03-04 RX ORDER — HYDROCORTISONE 25 MG/G
CREAM TOPICAL 3 TIMES DAILY PRN
Status: DISCONTINUED | OUTPATIENT
Start: 2018-03-04 | End: 2018-03-06 | Stop reason: HOSPADM

## 2018-03-04 RX ORDER — OXYCODONE AND ACETAMINOPHEN 5; 325 MG/1; MG/1
1 TABLET ORAL EVERY 4 HOURS PRN
Status: DISCONTINUED | OUTPATIENT
Start: 2018-03-04 | End: 2018-03-06 | Stop reason: HOSPADM

## 2018-03-04 RX ORDER — FAMOTIDINE 10 MG/ML
20 INJECTION INTRAVENOUS ONCE
Status: DISCONTINUED | OUTPATIENT
Start: 2018-03-04 | End: 2018-03-04

## 2018-03-04 RX ORDER — ONDANSETRON 8 MG/1
8 TABLET, ORALLY DISINTEGRATING ORAL EVERY 8 HOURS PRN
Status: DISCONTINUED | OUTPATIENT
Start: 2018-03-04 | End: 2018-03-04

## 2018-03-04 RX ORDER — LIDOCAINE HYDROCHLORIDE AND EPINEPHRINE 15; 5 MG/ML; UG/ML
INJECTION, SOLUTION EPIDURAL
Status: DISCONTINUED | OUTPATIENT
Start: 2018-03-04 | End: 2018-03-04

## 2018-03-04 RX ORDER — OXYTOCIN/RINGER'S LACTATE 20/1000 ML
20 PLASTIC BAG, INJECTION (ML) INTRAVENOUS ONCE
Status: COMPLETED | OUTPATIENT
Start: 2018-03-04 | End: 2018-03-04

## 2018-03-04 RX ORDER — FENTANYL/BUPIVACAINE/NS/PF 2MCG/ML-.1
PLASTIC BAG, INJECTION (ML) INJECTION CONTINUOUS PRN
Status: DISCONTINUED | OUTPATIENT
Start: 2018-03-04 | End: 2018-03-04

## 2018-03-04 RX ORDER — OXYCODONE AND ACETAMINOPHEN 10; 325 MG/1; MG/1
1 TABLET ORAL EVERY 4 HOURS PRN
Status: DISCONTINUED | OUTPATIENT
Start: 2018-03-04 | End: 2018-03-06 | Stop reason: HOSPADM

## 2018-03-04 RX ORDER — OXYTOCIN/RINGER'S LACTATE 20/1000 ML
41.65 PLASTIC BAG, INJECTION (ML) INTRAVENOUS CONTINUOUS
Status: DISCONTINUED | OUTPATIENT
Start: 2018-03-04 | End: 2018-03-04

## 2018-03-04 RX ORDER — CARBOPROST TROMETHAMINE 250 UG/ML
250 INJECTION, SOLUTION INTRAMUSCULAR
Status: DISCONTINUED | OUTPATIENT
Start: 2018-03-04 | End: 2018-03-04

## 2018-03-04 RX ORDER — FENTANYL/BUPIVACAINE/NS/PF 2MCG/ML-.1
PLASTIC BAG, INJECTION (ML) INJECTION CONTINUOUS
Status: DISCONTINUED | OUTPATIENT
Start: 2018-03-04 | End: 2018-03-04

## 2018-03-04 RX ORDER — SODIUM CHLORIDE, SODIUM LACTATE, POTASSIUM CHLORIDE, CALCIUM CHLORIDE 600; 310; 30; 20 MG/100ML; MG/100ML; MG/100ML; MG/100ML
INJECTION, SOLUTION INTRAVENOUS CONTINUOUS
Status: DISCONTINUED | OUTPATIENT
Start: 2018-03-04 | End: 2018-03-04

## 2018-03-04 RX ORDER — SODIUM CITRATE AND CITRIC ACID MONOHYDRATE 334; 500 MG/5ML; MG/5ML
30 SOLUTION ORAL ONCE
Status: DISCONTINUED | OUTPATIENT
Start: 2018-03-04 | End: 2018-03-04

## 2018-03-04 RX ORDER — IBUPROFEN 600 MG/1
600 TABLET ORAL EVERY 6 HOURS
Status: DISCONTINUED | OUTPATIENT
Start: 2018-03-05 | End: 2018-03-06 | Stop reason: HOSPADM

## 2018-03-04 RX ORDER — MISOPROSTOL 200 UG/1
600 TABLET ORAL
Status: DISCONTINUED | OUTPATIENT
Start: 2018-03-04 | End: 2018-03-04

## 2018-03-04 RX ORDER — METHYLERGONOVINE MALEATE 0.2 MG/ML
200 INJECTION INTRAVENOUS
Status: DISCONTINUED | OUTPATIENT
Start: 2018-03-04 | End: 2018-03-04

## 2018-03-04 RX ADMIN — Medication 10 ML/HR: at 04:03

## 2018-03-04 RX ADMIN — Medication 41.65 MILLI-UNITS/MIN: at 06:03

## 2018-03-04 RX ADMIN — LIDOCAINE HYDROCHLORIDE,EPINEPHRINE BITARTRATE 3 ML: 15; .005 INJECTION, SOLUTION EPIDURAL; INFILTRATION; INTRACAUDAL; PERINEURAL at 04:03

## 2018-03-04 RX ADMIN — SODIUM CHLORIDE, POTASSIUM CHLORIDE, SODIUM LACTATE AND CALCIUM CHLORIDE 1000 ML: 600; 310; 30; 20 INJECTION, SOLUTION INTRAVENOUS at 04:03

## 2018-03-04 RX ADMIN — OXYCODONE HYDROCHLORIDE AND ACETAMINOPHEN 1 TABLET: 5; 325 TABLET ORAL at 10:03

## 2018-03-04 RX ADMIN — Medication 20 UNITS: at 06:03

## 2018-03-04 NOTE — ANESTHESIA PROCEDURE NOTES
Epidural    Patient location during procedure: OB   Reason for block: primary anesthetic   Diagnosis:    Start time: 3/4/2018 4:11 PM  Timeout: 3/4/2018 4:10 PM  End time: 3/4/2018 4:20 PM  Surgery related to: Vaginal Delivery  Staffing  Anesthesiologist: KIM TOMLINSON  Resident/CRNA: JENNIFFER BEDOLLA  Performed: resident/CRNA   Preanesthetic Checklist  Completed: patient identified, site marked, surgical consent, pre-op evaluation, timeout performed, IV checked, risks and benefits discussed, monitors and equipment checked, anesthesia consent given, hand hygiene performed and patient being monitored  Preparation  Patient position: sitting  Prep: ChloraPrep  Patient monitoring: Pulse Ox  Epidural  Skin Anesthetic: lidocaine 1%  Skin Wheal: 3 mL  Administration type: continuous  Approach: midline  Interspace: L3-4  Injection technique: JORDANA saline  Needle and Epidural Catheter  Needle type: Tuohy   Needle gauge: 17  Needle length: 3.5 inches  Needle insertion depth: 5 cm  Catheter type: springwound  Catheter size: 19 G  Catheter at skin depth: 10 cm  Test dose: 3 mL of lidocaine 1.5% with Epi 1-to-200,000  Additional Documentation: incremental injection, negative aspiration for heme and CSF, no paresthesia on injection, no signs/symptoms of IV or SA injection, no significant pain on injection and no significant complaints from patient  Needle localization: anatomical landmarks  Medications:  Bolus administered: 10 mL of 0.125% bupivacaine  Volume per aspiration: 5 mL  Time between aspirations: 5 minutes  Assessment  Ease of block: easy  Patient's tolerance of the procedure: comfortable throughout block and no complaints

## 2018-03-04 NOTE — ED PROVIDER NOTES
"Encounter Date: 3/4/2018       History     Chief Complaint   Patient presents with    Contractions     Lurdes Day is a 27 y.o. [id number] at 38w3d presents complaining of contractions over the last "few days." Patient presented yesterday morning at 4 cm, discharged after 2 hour rule out labor. Now returns with "feeling like I got to poop." Patient reports contractions are 5 minutes apart. Denies gush of fluid, bleeding, decreased FM.   This IUP is complicated by late to PNC, intermittent PNC, suboptimal dating.  Patient reports good FM.           Review of patient's allergies indicates:  No Known Allergies  History reviewed. No pertinent past medical history.  Past Surgical History:   Procedure Laterality Date    HERNIA REPAIR       Family History   Problem Relation Age of Onset    Colon cancer Neg Hx     Hypertension Neg Hx      Social History   Substance Use Topics    Smoking status: Never Smoker    Smokeless tobacco: Never Used    Alcohol use No     Review of Systems   Constitutional: Negative for chills, fatigue and fever.   HENT: Negative for congestion.    Eyes: Negative for visual disturbance.   Respiratory: Negative for cough and shortness of breath.    Cardiovascular: Negative for chest pain and palpitations.   Gastrointestinal: Negative for abdominal distention, abdominal pain, constipation, diarrhea, nausea and vomiting.   Genitourinary: Negative for difficulty urinating, dysuria, hematuria, vaginal bleeding and vaginal discharge.   Skin: Negative for rash.   Neurological: Negative for dizziness, seizures, light-headedness and headaches.   Hematological: Does not bruise/bleed easily.   Psychiatric/Behavioral: Negative for dysphoric mood. The patient is not nervous/anxious.        Physical Exam     Vitals:    18 1634 18 1636 18 1649 18 1651   BP:  127/87  113/66   Pulse: 69 76 75 72   SpO2: 100%  100%    Weight:       Height:             Physical Exam    Nursing note and " vitals reviewed.  Constitutional: She appears well-developed and well-nourished. She is not diaphoretic. No distress.   HENT:   Head: Normocephalic and atraumatic.   Eyes: Conjunctivae and EOM are normal.   Neck: Normal range of motion.   Cardiovascular: Normal rate.   Pulmonary/Chest: No respiratory distress.   Abdominal: Soft. There is no tenderness. There is no rebound and no guarding.   Gravid, fundus NT   Musculoskeletal: Normal range of motion.   Neurological: She is alert and oriented to person, place, and time.   Skin: Skin is warm and dry. No rash noted. No erythema.   Psychiatric: She has a normal mood and affect.     OB LABOR EXAM:           Engagement: engaged.   Dilatation: 8.   Station: 0.   Effacement: 90%.       Comments: NSTs established 135       ED Course   Procedures  Labs Reviewed - No data to display          Medical Decision Making:   ED Management:  - Consents signed and to chart  - Admit to Labor and Delivery unit  - Plan for expectant management    - Epidural per Anesthesia  - Draw CBC, T&S  - Notify Staff  - Ultrasound performed, fetus in cepahlic position by physical exam  - Recheck in 2 hrs or PRN  - Post-Partum Hemorrhage risk - low                  Attending Attestation:   Physician Attestation Statement for Resident:  As the supervising MD   Physician Attestation Statement: I have personally seen and examined this patient.   I agree with the above history. -:   As the supervising MD I agree with the above PE.    As the supervising MD I agree with the above treatment, course, plan, and disposition.  I was personally present during the critical portions of the procedure(s) performed by the resident and was immediately available in the ED to provide services and assistance as needed during the entire procedure.  I have reviewed the following: old records at this facility.                       Clinical Impression:   The primary encounter diagnosis was Indication for care in labor and  delivery, antepartum. A diagnosis of 38 weeks gestation of pregnancy was also pertinent to this visit.    Disposition:   Disposition: Discharged  Condition: Stable                        Delvin Concepcion MD  Resident  03/04/18 [id number]       Pinky Leon DO  03/04/18 8412

## 2018-03-04 NOTE — SUBJECTIVE & OBJECTIVE
Obstetric History       T3      L2     SAB1   TAB0   Ectopic0   Multiple0   Live Births3       # Outcome Date GA Lbr Leif/2nd Weight Sex Delivery Anes PTL Lv   5 Current            4 Term 16 40w2d  3.74 kg (8 lb 3.9 oz) M Vag-Spont EPI N [place]      Name: BEAU VINSON      Apgar1:  9                Apgar5: 9   3 Term 10/26/14 39w2d / 00:19 2.801 kg (6 lb 2.8 oz) F Vag-Spont EPI N DEC      Apgar1:  9                Apgar5: 9   2 SAB 13           1 Term 12 39w0d  3.317 kg (7 lb 5 oz) F Vag-Spont EPI  [place]      Name: journey        History reviewed. No pertinent past medical history.  Past Surgical History:   Procedure Laterality Date    HERNIA REPAIR         No prescriptions prior to admission.       Review of patient's allergies indicates:  No Known Allergies     Family History     None        Social History Main Topics    Smoking status: Never Smoker    Smokeless tobacco: Never Used    Alcohol use No    Drug use: No    Sexual activity: Yes     Partners: Male     Birth control/ protection: None     Review of Systems   Constitutional: Negative for activity change and fever.   Respiratory: Negative for cough and shortness of breath.    Cardiovascular: Negative for chest pain.   Gastrointestinal: Positive for bloating. Negative for abdominal pain, nausea and vomiting.   Genitourinary: Negative for dyspareunia, vaginal bleeding, vaginal discharge and vaginal pain.      Objective:     Vital Signs (Most Recent):    Vital Signs (24h Range):           There is no height or weight on file to calculate BMI.    FHT: 135BPM    Physical Exam:   Constitutional: She is oriented to person, place, and time. She appears well-developed and well-nourished. No distress.    HENT:   Head: Normocephalic and atraumatic.    Eyes: Conjunctivae are normal. Right eye exhibits no discharge. Left eye exhibits no discharge.    Neck: Neck supple. No tracheal deviation present.            Genitourinary:    Genitourinary Comments: SVE: 8/90/0               Neurological: She is alert and oriented to person, place, and time.    Skin: She is not diaphoretic.        Cervix:  Dilation:  8  Effacement:  75%  Station: -3  Presentation: Vertex     Significant Labs:  Lab Results   Component Value Date    GROUPTRH O POS 02/19/2018    HEPBSAG Negative 02/19/2018    STREPBCULT No Group B Streptococcus isolated 01/18/2018       I have personallly reviewed all pertinent lab results from the last 24 hours.

## 2018-03-04 NOTE — ASSESSMENT & PLAN NOTE
- Consents signed and to chart  - Admit to Labor and Delivery unit  - Plan for expectant labor management    - Epidural per Anesthesia  - Draw CBC, T&S  - Notify Staff  - Ultrasound performed, fetus in cepahlic position.  - Recheck in 2 hrs or PRN  - Post-Partum Hemorrhage risk - low

## 2018-03-04 NOTE — ANESTHESIA PREPROCEDURE EVALUATION
Lurdes Day is a 27 y.o. female [id number] at 38w3d presents in labor. Otherwise healthy female.     OB History    Para Term  AB Living   5 3 3 0 1 2   SAB TAB Ectopic Multiple Live Births   1 0 0 0 3      # Outcome Date GA Lbr Leif/2nd Weight Sex Delivery Anes PTL Lv   5 Current            4 Term 16 40w2d  3.74 kg (8 lb 3.9 oz) M Vag-Spont EPI N [place]   3 Term 10/26/14 39w2d / 00:19 2.801 kg (6 lb 2.8 oz) F Vag-Spont EPI N DEC   2 SAB 13           1 Term 12 39w0d  3.317 kg (7 lb 5 oz) F Vag-Spont EPI  [place]          Wt Readings from Last 1 Encounters:   18 1623 75.5 kg (166 lb 7.2 oz)       BP Readings from Last 3 Encounters:   18 126/82   18 112/70   18 124/70       Patient Active Problem List   Diagnosis    Pregnancy with one fetus in third trimester    Abdominal pain in pregnancy    Late prenatal care    Indication for care in labor and delivery, antepartum       Past Surgical History:   Procedure Laterality Date    HERNIA REPAIR         Social History     Social History    Marital status: Single     Spouse name: N/A    Number of children: N/A    Years of education: N/A     Occupational History    Not on file.     Social History Main Topics    Smoking status: Never Smoker    Smokeless tobacco: Never Used    Alcohol use No    Drug use: No    Sexual activity: Yes     Partners: Male     Birth control/ protection: None     Other Topics Concern    Not on file     Social History Narrative    No narrative on file         Chemistry        Component Value Date/Time     2018 1645    K 3.2 (L) 2018 1645     2018 1645    CO2 26 2018 1645    BUN 5 (L) 2018 1645    CREATININE 0.7 2018 1645    GLU 99 2018 1645        Component Value Date/Time    CALCIUM 9.0 2018 1645    ESTGFRAFRICA >60 2018 1645    EGFRNONAA >60 2018 1645            Lab Results   Component Value Date    WBC 11.86  03/04/2018    HGB 9.8 (L) 03/04/2018    HCT 31.4 (L) 03/04/2018    MCV 77 (L) 03/04/2018     03/04/2018       No results for input(s): PT, INR, PROTIME, APTT in the last 72 hours.                  Anesthesia Evaluation    I have reviewed the Patient Summary Reports.    I have reviewed the Nursing Notes.   I have reviewed the Medications.     Review of Systems  Anesthesia Hx:  No problems with previous Anesthesia  History of prior surgery of interest to airway management or planning: Denies Family Hx of Anesthesia complications.   Denies Personal Hx of Anesthesia complications.   Hematology/Oncology:  Hematology Normal   Oncology Normal     EENT/Dental:EENT/Dental Normal   Cardiovascular:  Cardiovascular Normal Exercise tolerance: good     Pulmonary:  Pulmonary Normal    Renal/:  Renal/ Normal     Hepatic/GI:  Hepatic/GI Normal    Neurological:  Neurology Normal        Physical Exam  General:  Well nourished    Airway/Jaw/Neck:  Airway Findings: Mouth Opening: Normal Tongue: Normal  General Airway Assessment: Adult  Mallampati: II  Improves to I with phonation.  TM Distance: Normal, at least 6 cm      Dental:  Dental Findings: In tact   Chest/Lungs:  Chest/Lungs Findings: Clear to auscultation, Normal Respiratory Rate     Heart/Vascular:  Heart Findings: Rate: Normal  Rhythm: Regular Rhythm  Sounds: Normal  Heart murmur: negative    Abdomen:  Abdomen Findings: Normal           Anesthesia Plan  Type of Anesthesia, risks & benefits discussed:  Anesthesia Type:  epidural, CSE, general, spinal  Patient's Preference:   Intra-op Monitoring Plan: standard ASA monitors  Intra-op Monitoring Plan Comments:   Post Op Pain Control Plan:   Post Op Pain Control Plan Comments:   Induction:   IV  Beta Blocker:  Patient is not currently on a Beta-Blocker (No further documentation required).       Informed Consent: Patient understands risks and agrees with Anesthesia plan.  Questions answered. Anesthesia consent signed with  patient.  ASA Score: 2     Day of Surgery Review of History & Physical: I have interviewed and examined the patient. I have reviewed the patient's H&P dated:            Ready For Surgery From Anesthesia Perspective.

## 2018-03-04 NOTE — HPI
"Lurdes Day is a 27 y.o. [id number] at 38w3d presents complaining of contractions over the last "few days." Patient presented yesterday morning at 4 cm, discharged after 2 hour rule out labor. Now returns with "feeling like I got to poop." Patient reports contractions are 5 minutes apart. Denies gush of fluid, bleeding, decreased FM.   This IUP is complicated by late to PNC, intermittent PNC, suboptimal dating.  Patient reports good FM.     "

## 2018-03-04 NOTE — H&P
"[facility]  Obstetrics  History & Physical    Patient Name: Lurdes Vinson  MRN: [id number]  Admission Date: 3/4/2018  Primary Care Provider: Primary Doctor No    Subjective:     Principal Problem:Indication for care in labor and delivery, antepartum    History of Present Illness:  Lurdes Vinson is a 27 y.o. [id number] at 38w3d presents complaining of contractions over the last "few days." Patient presented yesterday morning at 4 cm, discharged after 2 hour rule out labor. Now returns with "feeling like I got to poop." Patient reports contractions are 5 minutes apart. Denies gush of fluid, bleeding, decreased FM.   This IUP is complicated by late to PNC, intermittent PNC, suboptimal dating.  Patient reports good FM.         Obstetric History       T3      L2     SAB1   TAB0   Ectopic0   Multiple0   Live Births3       # Outcome Date GA Lbr Leif/2nd Weight Sex Delivery Anes PTL Lv   5 Current            4 Term 16 40w2d  3.74 kg (8 lb 3.9 oz) M Vag-Spont EPI N [place]      Name: BEAU VINSON      Apgar1:  9                Apgar5: 9   3 Term 10/26/14 39w2d / 00:19 2.801 kg (6 lb 2.8 oz) F Vag-Spont EPI N DEC      Apgar1:  9                Apgar5: 9   2 SAB 13           1 Term 12 39w0d  3.317 kg (7 lb 5 oz) F Vag-Spont EPI  [place]      Name: journey        History reviewed. No pertinent past medical history.  Past Surgical History:   Procedure Laterality Date    HERNIA REPAIR         No prescriptions prior to admission.       Review of patient's allergies indicates:  No Known Allergies     Family History     None        Social History Main Topics    Smoking status: Never Smoker    Smokeless tobacco: Never Used    Alcohol use No    Drug use: No    Sexual activity: Yes     Partners: Male     Birth control/ protection: None     Review of Systems   Constitutional: Negative for activity change and fever.   Respiratory: Negative for cough and shortness of breath.  "   Cardiovascular: Negative for chest pain.   Gastrointestinal: Positive for bloating. Negative for abdominal pain, nausea and vomiting.   Genitourinary: Negative for dyspareunia, vaginal bleeding, vaginal discharge and vaginal pain.      Objective:     Vital Signs (Most Recent):    Vital Signs (24h Range):           There is no height or weight on file to calculate BMI.    FHT: 135BPM    Physical Exam:   Constitutional: She is oriented to person, place, and time. She appears well-developed and well-nourished. No distress.    HENT:   Head: Normocephalic and atraumatic.    Eyes: Conjunctivae are normal. Right eye exhibits no discharge. Left eye exhibits no discharge.    Neck: Neck supple. No tracheal deviation present.            Genitourinary:   Genitourinary Comments: SVE: /               Neurological: She is alert and oriented to person, place, and time.    Skin: She is not diaphoretic.        Cervix:  Dilation:  8  Effacement:  75%  Station: -3  Presentation: Vertex     Significant Labs:  Lab Results   Component Value Date    GROUPTRH O POS 2018    HEPBSAG Negative 2018    STREPBCULT No Group B Streptococcus isolated 2018       I have personallly reviewed all pertinent lab results from the last 24 hours.    Assessment/Plan:     27 y.o. female  at 38w4d for:    * Indication for care in labor and delivery, antepartum    - Consents signed and to chart  - Admit to Labor and Delivery unit  - Plan for expectant labor management    - Epidural per Anesthesia  - Draw CBC, T&S  - Notify Staff  - Ultrasound performed, fetus in cepahlic position.  - Recheck in 2 hrs or PRN  - Post-Partum Hemorrhage risk - low                  Delvin Concepcion MD  Obstetrics  [facility]

## 2018-03-04 NOTE — HOSPITAL COURSE
2018 - Patient admitted from [facility] at 8cm cervical dilation. Labor progressed without complications. , see delivery note for details.  2018 PPD#1 s/p . Doing well, meeting all pp milestones. Bottle feeding.   2018 PPD#2. Continues to do well. Meeting pp milestones. Desires discharge.

## 2018-03-05 PROBLEM — D50.9 IRON DEFICIENCY ANEMIA: Status: ACTIVE | Noted: 2018-03-05

## 2018-03-05 LAB
BASOPHILS # BLD AUTO: 0.02 K/UL
BASOPHILS NFR BLD: 0.2 %
DIFFERENTIAL METHOD: ABNORMAL
EOSINOPHIL # BLD AUTO: 0.1 K/UL
EOSINOPHIL NFR BLD: 0.5 %
ERYTHROCYTE [DISTWIDTH] IN BLOOD BY AUTOMATED COUNT: 15.3 %
HCT VFR BLD AUTO: 27.2 %
HGB BLD-MCNC: 8.5 G/DL
LYMPHOCYTES # BLD AUTO: 3 K/UL
LYMPHOCYTES NFR BLD: 27 %
MCH RBC QN AUTO: 23.6 PG
MCHC RBC AUTO-ENTMCNC: 31.3 G/DL
MCV RBC AUTO: 76 FL
MONOCYTES # BLD AUTO: 0.8 K/UL
MONOCYTES NFR BLD: 6.7 %
NEUTROPHILS # BLD AUTO: 7.3 K/UL
NEUTROPHILS NFR BLD: 65.2 %
PLATELET # BLD AUTO: 204 K/UL
PMV BLD AUTO: 9.8 FL
RBC # BLD AUTO: 3.6 M/UL
WBC # BLD AUTO: 11.17 K/UL

## 2018-03-05 PROCEDURE — 25000003 PHARM REV CODE 250: Performed by: OBSTETRICS & GYNECOLOGY

## 2018-03-05 PROCEDURE — 11000001 HC ACUTE MED/SURG PRIVATE ROOM

## 2018-03-05 PROCEDURE — 25000003 PHARM REV CODE 250: Performed by: STUDENT IN AN ORGANIZED HEALTH CARE EDUCATION/TRAINING PROGRAM

## 2018-03-05 PROCEDURE — 99232 SBSQ HOSP IP/OBS MODERATE 35: CPT | Mod: ,,, | Performed by: OBSTETRICS & GYNECOLOGY

## 2018-03-05 PROCEDURE — 36415 COLL VENOUS BLD VENIPUNCTURE: CPT

## 2018-03-05 PROCEDURE — 85025 COMPLETE CBC W/AUTO DIFF WBC: CPT

## 2018-03-05 RX ORDER — DOCUSATE SODIUM 100 MG/1
100 CAPSULE, LIQUID FILLED ORAL 2 TIMES DAILY
Status: DISCONTINUED | OUTPATIENT
Start: 2018-03-05 | End: 2018-03-06 | Stop reason: HOSPADM

## 2018-03-05 RX ORDER — FERROUS SULFATE 325(65) MG
325 TABLET, DELAYED RELEASE (ENTERIC COATED) ORAL DAILY
Status: DISCONTINUED | OUTPATIENT
Start: 2018-03-05 | End: 2018-03-06 | Stop reason: HOSPADM

## 2018-03-05 RX ORDER — IBUPROFEN 600 MG/1
600 TABLET ORAL EVERY 6 HOURS
Qty: 30 TABLET | Refills: 1 | Status: SHIPPED | OUTPATIENT
Start: 2018-03-05 | End: 2018-11-18

## 2018-03-05 RX ADMIN — FERROUS SULFATE TAB EC 325 MG (65 MG FE EQUIVALENT) 325 MG: 325 (65 FE) TABLET DELAYED RESPONSE at 09:03

## 2018-03-05 RX ADMIN — DOCUSATE SODIUM 100 MG: 100 CAPSULE, LIQUID FILLED ORAL at 09:03

## 2018-03-05 RX ADMIN — IBUPROFEN 600 MG: 600 TABLET, FILM COATED ORAL at 05:03

## 2018-03-05 RX ADMIN — IBUPROFEN 600 MG: 600 TABLET, FILM COATED ORAL at 11:03

## 2018-03-05 RX ADMIN — IBUPROFEN 600 MG: 600 TABLET, FILM COATED ORAL at 12:03

## 2018-03-05 NOTE — ANESTHESIA POSTPROCEDURE EVALUATION
"Anesthesia Post Evaluation    Patient: Lurdes Day    Procedure(s) Performed: * No procedures listed *    Final Anesthesia Type: epidural  Patient location during evaluation: labor & delivery  Patient participation: Yes- Able to Participate  Level of consciousness: awake and alert and oriented  Post-procedure vital signs: reviewed and stable  Pain management: adequate  Airway patency: patent  PONV status at discharge: No PONV  Anesthetic complications: no      Cardiovascular status: blood pressure returned to baseline  Respiratory status: unassisted, room air and spontaneous ventilation  Hydration status: euvolemic  Follow-up not needed.        Visit Vitals  /74 (BP Location: Left arm, Patient Position: Lying)   Pulse 67   Temp 36.9 °C (98.5 °F) (Oral)   Resp 16   Ht 5' 6" (1.676 m)   Wt 75.5 kg (166 lb 7.2 oz)   LMP 04/22/2017   SpO2 99%   Breastfeeding? No   BMI 26.87 kg/m²       Pain/Barbara Score: Pain Rating Prior to Med Admin: 0 (3/5/2018 11:50 AM)  Pain Rating Post Med Admin: 4 (3/4/2018 10:45 PM)      "

## 2018-03-05 NOTE — PLAN OF CARE
Problem: Patient Care Overview  Goal: Plan of Care Review  Outcome: Ongoing (interventions implemented as appropriate)  Plan of care reviewed . tolerating diet well. voidng without difficulty. Vital signs stable. Moderate rubra, pain controlled with oral pain meds.formula feeding . encouraged skin to skin.

## 2018-03-05 NOTE — PROGRESS NOTES
[facility]  Obstetrics  Postpartum Progress Note    Patient Name: Lurdes Day  MRN: [id number]  Admission Date: 3/4/2018  Hospital Length of Stay: 1 days  Attending Physician: Sangita Ochoa DO  Primary Care Provider: Primary Doctor No    Subjective:     Principal Problem: (spontaneous vaginal delivery)    Hospital course: 2018 - Patient admitted from [facility] at 8cm cervical dilation. Labor progressed without complications. , see delivery note for details.  2018 PPD#1 s/p . Doing well, meeting all pp milestones. Bottle feeding.     Interval History:     She is doing well this morning. She is tolerating a regular diet without nausea or vomiting. She is voiding spontaneously. She is ambulating. She has passed flatus, and has not a BM. Vaginal bleeding is mild. She denies fever or chills. Abdominal pain is mild and controlled with oral medications. She is breastfeeding. She desires circumcision for her male baby: not applicable.    Objective:     Vital Signs (Most Recent):  Temp: 97.6 °F (36.4 °C) (18)  Pulse: 80 (18)  Resp: 20 (18)  BP: 121/70 (18)  SpO2: 99 % (18 2200) Vital Signs (24h Range):  Temp:  [97.6 °F (36.4 °C)-98.6 °F (37 °C)] 97.6 °F (36.4 °C)  Pulse:  [62-87] 80  Resp:  [18-20] 20  SpO2:  [99 %-100 %] 99 %  BP: (113-145)/(66-95) 121/70     Weight: 75.5 kg (166 lb 7.2 oz)  Body mass index is 26.87 kg/m².      Intake/Output Summary (Last 24 hours) at 18 0700  Last data filed at 18 0515   Gross per 24 hour   Intake                0 ml   Output             1250 ml   Net            -1250 ml       Significant Labs:  Lab Results   Component Value Date    GROUPTRH O POS 2018    HEPBSAG Negative 2018    STREPBCULT No Group B Streptococcus isolated 2018       Recent Labs  Lab 18  0436   HGB 8.5*   HCT 27.2*       CBC:   Recent Labs  Lab 18   WBC 11.17   RBC 3.60*   HGB 8.5*    HCT 27.2*      MCV 76*   MCH 23.6*   MCHC 31.3*       Physical Exam:   Constitutional: She is oriented to person, place, and time. She appears well-developed and well-nourished. No distress.    HENT:   Head: Normocephalic and atraumatic.      Cardiovascular: Normal rate, regular rhythm and normal heart sounds.     Pulmonary/Chest: Effort normal and breath sounds normal.        Abdominal: Soft. Bowel sounds are normal. She exhibits no distension. There is no tenderness.   Fundus firm below umbilicus              Musculoskeletal: Normal range of motion. She exhibits no edema.       Neurological: She is alert and oriented to person, place, and time.    Skin: Skin is warm and dry.    Psychiatric: She has a normal mood and affect.       Assessment/Plan:     27 y.o. female  for:    *  (spontaneous vaginal delivery)    Postpartum care:  - Patient doing well. Continue routine management and advances.  - Continue PO pain meds. Pain well controlled.  - Encourage ambulation.   - Heme: Pre Delivery h/h  --> Post Delivery h/h   - Circumcision - N/a   - Contraception - SB  - Lactation - The patient is bottle feeding. Lactation nurse following along PRN  - Rh Status - O pos                Iron deficiency anemia    - anemia noted on admit at  to  pp  - will start fe and colace            Disposition: As patient meets milestones, will plan to discharge tomorrow.    Ashley Guzmán MD  Obstetrics  [facility]    CHRONIC AND ABLA, ASYMPTOMATIC; CONT ROUTINE PP CARE  I have personally taken the history and have examined this patient, and I agree with the resident's note as stated above.

## 2018-03-05 NOTE — SUBJECTIVE & OBJECTIVE
Hospital course: 2018 - Patient admitted from [facility] at 8cm cervical dilation. Labor progressed without complications. , see delivery note for details.  2018 PPD#1 s/p . Doing well, meeting all pp milestones. Bottle feeding.     Interval History:     She is doing well this morning. She is tolerating a regular diet without nausea or vomiting. She is voiding spontaneously. She is ambulating. She has passed flatus, and has not a BM. Vaginal bleeding is mild. She denies fever or chills. Abdominal pain is mild and controlled with oral medications. She is breastfeeding. She desires circumcision for her male baby: not applicable.    Objective:     Vital Signs (Most Recent):  Temp: 97.6 °F (36.4 °C) (18)  Pulse: 80 (18)  Resp: 20 (18)  BP: 121/70 (18)  SpO2: 99 % (18 2200) Vital Signs (24h Range):  Temp:  [97.6 °F (36.4 °C)-98.6 °F (37 °C)] 97.6 °F (36.4 °C)  Pulse:  [62-87] 80  Resp:  [18-20] 20  SpO2:  [99 %-100 %] 99 %  BP: (113-145)/(66-95) 121/70     Weight: 75.5 kg (166 lb 7.2 oz)  Body mass index is 26.87 kg/m².      Intake/Output Summary (Last 24 hours) at 18 0700  Last data filed at 18 0515   Gross per 24 hour   Intake                0 ml   Output             1250 ml   Net            -1250 ml       Significant Labs:  Lab Results   Component Value Date    GROUPTRH O POS 2018    HEPBSAG Negative 2018    STREPBCULT No Group B Streptococcus isolated 2018       Recent Labs  Lab 18  043   HGB 8.5*   HCT 27.2*       CBC:   Recent Labs  Lab 18   WBC 11.17   RBC 3.60*   HGB 8.5*   HCT 27.2*      MCV 76*   MCH 23.6*   MCHC 31.3*       Physical Exam:   Constitutional: She is oriented to person, place, and time. She appears well-developed and well-nourished. No distress.    HENT:   Head: Normocephalic and atraumatic.      Cardiovascular: Normal rate, regular rhythm and normal heart sounds.      Pulmonary/Chest: Effort normal and breath sounds normal.        Abdominal: Soft. Bowel sounds are normal. She exhibits no distension. There is no tenderness.   Fundus firm below umbilicus              Musculoskeletal: Normal range of motion. She exhibits no edema.       Neurological: She is alert and oriented to person, place, and time.    Skin: Skin is warm and dry.    Psychiatric: She has a normal mood and affect.

## 2018-03-05 NOTE — ASSESSMENT & PLAN NOTE
Postpartum care:  - Patient doing well. Continue routine management and advances.  - Continue PO pain meds. Pain well controlled.  - Encourage ambulation.   - Heme: Pre Delivery h/h 9/31 --> Post Delivery h/h 8/27  - Circumcision - N/a   - Contraception - SB  - Lactation - The patient is bottle feeding. Lactation nurse following along PRN  - Rh Status - O pos

## 2018-03-05 NOTE — L&D DELIVERY NOTE
[facility]  Vaginal Delivery   Operative Note    SUMMARY     Normal spontaneous vaginal delivery of live infant, was placed on mothers abdomen for skin to skin and bulb suctioning performed.  Infant delivered position OA over intact perineum.  Nuchal cord: No.    Spontaneous delivery of placenta and IV pitocin given noting good uterine tone.  1st degree laceration noted and repaired with 2-0 vicryl. Right vaginal sidewall laceration repaired with 2-0 vicryl.  Patient tolerated delivery well. Sponge needle and lap counted correctly x2.    Indications: Indication for care in labor and delivery, antepartum  Pregnancy complicated by:   Patient Active Problem List   Diagnosis    Pregnancy with one fetus in third trimester    Abdominal pain in pregnancy     (spontaneous vaginal delivery)    Late prenatal care    Indication for care in labor and delivery, antepartum     Admitting GA: 38w4d    Delivery Information for  Venancio Day    Birth information:  YOB: 2016   Time of birth: 6:16 PM   Sex: female   Head Delivery Date/Time: 3/4/2018  6:16 PM   Delivery type: Vaginal, Spontaneous Delivery   Gestational Age: 38w4d    Delivery Providers    Delivering clinician:  Maria Luisa Mcmillan MD   Provider Role    MD Kenton Bhat MD Paige Nelson, RN     She Tomlinson, MARCELINO Bradley RN              Measurements    Weight:  3374 g  Length:  49.5 cm  Head circumference:  31.8 cm  Chest circumference:  33 cm         [place] Assessment    Living status:  Living  Apgars:     1 Minute:   5 Minute:   10 Minute:   15 Minute:   20 Minute:     Skin Color:   1  1       Heart Rate:   2  2       Reflex Irritability:   2  2       Muscle Tone:   2  2       Respiratory Effort:   2  2       Total:   9  9               Apgars Assigned By:  GEOVANNI TATE RN         Assisted Delivery Details:    Forceps attempted?:  No  Vacuum extractor attempted?:  No         Shoulder  Dystocia    Shoulder dystocia present?:  No           Presentation and Position    Presentation:  Vertex  Position:  Middle Occiput Anterior           Interventions/Resuscitation    Method:  None       Cord    Vessels:  3 vessels  Complications:  None  Cord Blood Disposition:  Sent with Baby  Gases Sent?:  No       Placenta    Date and time:  3/4/2018  6:16 PM  Removal:  Spontaneous  Appearance:  Intact  Placenta disposition:  discarded           Labor Events:       labor: No     Labor Onset Date/Time:         Dilation Complete Date/Time:         Start Pushing Date/Time:       Rupture Date/Time:              Rupture type:           Fluid Amount:        Fluid Color:        Fluid Odor:        Membrane Status (PeriCalm): ARM (Artificial Rupture)      Rupture Date/Time (PeriCalm): 2018 18:08:00      Fluid Amount (PeriCalm): Moderate      Fluid Color (PeriCalm): Clear       steroids: None     Antibiotics given for GBS: No     Induction: none     Indications for induction:        Augmentation:       Indications for augmentation:       Labor complications: None     Additional complications:          Cervical ripening:                     Delivery:      Episiotomy: None     Indication for Episiotomy:       Perineal Lacerations: 1st Repaired:  Yes   Periurethral Laceration:   Repaired:     Labial Laceration:   Repaired:     Sulcus Laceration:   Repaired:     Vaginal Laceration: Yes Repaired: Yes   Cervical Laceration:   Repaired:     Repair suture:       Repair # of packets: 1     Vaginal delivery QBL (mL): 150      QBL (mL): 0     Combined Blood Loss (mL): 150     Vaginal Sweep Performed:       Surgicount Correct:         Other providers:       Anesthesia    Method:  Spinal, Epidural          Details (if applicable):  Trial of Labor      Categorization:      Priority:     Indications for :     Incision Type:       Additional  information:  Forceps:     Vacuum:    Breech:    Observed anomalies    Other (Comments):         Kenton Renteria MD  PGY1, OBGYN [facility]

## 2018-03-06 VITALS
SYSTOLIC BLOOD PRESSURE: 98 MMHG | HEART RATE: 78 BPM | OXYGEN SATURATION: 98 % | BODY MASS INDEX: 26.75 KG/M2 | DIASTOLIC BLOOD PRESSURE: 55 MMHG | HEIGHT: 66 IN | WEIGHT: 166.44 LBS | TEMPERATURE: 98 F | RESPIRATION RATE: 18 BRPM

## 2018-03-06 PROBLEM — D50.9 IRON DEFICIENCY ANEMIA: Status: ACTIVE | Noted: 2018-03-06

## 2018-03-06 PROCEDURE — 25000003 PHARM REV CODE 250: Performed by: STUDENT IN AN ORGANIZED HEALTH CARE EDUCATION/TRAINING PROGRAM

## 2018-03-06 PROCEDURE — 25000003 PHARM REV CODE 250: Performed by: OBSTETRICS & GYNECOLOGY

## 2018-03-06 RX ADMIN — IBUPROFEN 600 MG: 600 TABLET, FILM COATED ORAL at 05:03

## 2018-03-06 RX ADMIN — FERROUS SULFATE TAB EC 325 MG (65 MG FE EQUIVALENT) 325 MG: 325 (65 FE) TABLET DELAYED RESPONSE at 08:03

## 2018-03-06 RX ADMIN — DOCUSATE SODIUM 100 MG: 100 CAPSULE, LIQUID FILLED ORAL at 08:03

## 2018-03-06 RX ADMIN — IBUPROFEN 600 MG: 600 TABLET, FILM COATED ORAL at 12:03

## 2018-03-06 NOTE — SUBJECTIVE & OBJECTIVE
Hospital course: 2018 - Patient admitted from [facility] at 8cm cervical dilation. Labor progressed without complications. , see delivery note for details.  2018 PPD#1 s/p . Doing well, meeting all pp milestones. Bottle feeding.   2018 PPD#2. Continues to do well. Meeting pp milestones. Desires discharge.     Interval History:     She is doing well this morning. She is tolerating a regular diet without nausea or vomiting. She is voiding spontaneously. She is ambulating. She has passed flatus, and has not a BM. Vaginal bleeding is mild. She denies fever or chills. Abdominal pain is mild and controlled with oral medications. She is breastfeeding. She desires circumcision for her male baby: not applicable.    Objective:     Vital Signs (Most Recent):  Temp: 98.4 °F (36.9 °C) (18)  Pulse: 76 (18)  Resp: 18 (18)  BP: 127/79 (18)  SpO2: 99 % (18) Vital Signs (24h Range):  Temp:  [98.4 °F (36.9 °C)-98.5 °F (36.9 °C)] 98.4 °F (36.9 °C)  Pulse:  [67-76] 76  Resp:  [16-18] 18  SpO2:  [99 %] 99 %  BP: (111-127)/(72-79) 127/79     Weight: 75.5 kg (166 lb 7.2 oz)  Body mass index is 26.87 kg/m².      Intake/Output Summary (Last 24 hours) at 18 07  Last data filed at 18 1735   Gross per 24 hour   Intake             2000 ml   Output                0 ml   Net             2000 ml       Significant Labs:  Lab Results   Component Value Date    GROUPTRH O POS 2018    HEPBSAG Negative 2018    STREPBCULT No Group B Streptococcus isolated 2018       Recent Labs  Lab 18  043   HGB 8.5*   HCT 27.2*       CBC:     Recent Labs  Lab 18  0436   WBC 11.17   RBC 3.60*   HGB 8.5*   HCT 27.2*      MCV 76*   MCH 23.6*   MCHC 31.3*       Physical Exam:   Constitutional: She is oriented to person, place, and time. She appears well-developed and well-nourished. No distress.    HENT:   Head: Normocephalic and atraumatic.       Cardiovascular: Normal rate, regular rhythm and normal heart sounds.     Pulmonary/Chest: Effort normal and breath sounds normal.        Abdominal: Soft. Bowel sounds are normal. She exhibits no distension. There is no tenderness.   Fundus firm below umbilicus              Musculoskeletal: Normal range of motion. She exhibits no edema.       Neurological: She is alert and oriented to person, place, and time.    Skin: Skin is warm and dry.    Psychiatric: She has a normal mood and affect.

## 2018-03-06 NOTE — PROGRESS NOTES
DC instructions given to patient, questions answered. No distress noted. Discharge home. [facility]

## 2018-03-06 NOTE — PROGRESS NOTES
[facility]  Obstetrics  Postpartum Progress Note    Patient Name: Lurdes Day  MRN: [id number]  Admission Date: 3/4/2018  Hospital Length of Stay: 2 days  Attending Physician: Sangita Ochoa DO  Primary Care Provider: Primary Doctor No    Subjective:     Principal Problem: (spontaneous vaginal delivery)    Hospital course: 2018 - [facility] at 8cm cervical dilation. Labor progressed without complications. , see delivery note for details.  2018 PPD#1 s/p . Doing well, meeting all pp milestones. Bottle feeding.   2018 PPD#2. Continues to do well. Meeting pp milestones. Desires discharge.     Interval History:     She is doing well this morning. She is tolerating a regular diet without nausea or vomiting. She is voiding spontaneously. She is ambulating. She has passed flatus, and has not a BM. Vaginal bleeding is mild. She denies fever or chills. Abdominal pain is mild and controlled with oral medications. She is breastfeeding. She desires circumcision for her male baby: not applicable.    Objective:     Vital Signs (Most Recent):  Temp: 98.4 °F (36.9 °C) (18)  Pulse: 76 (18)  Resp: 18 (18)  BP: 127/79 (18)  SpO2: 99 % (18) Vital Signs (24h Range):  Temp:  [98.4 °F (36.9 °C)-98.5 °F (36.9 °C)] 98.4 °F (36.9 °C)  Pulse:  [67-76] 76  Resp:  [16-18] 18  SpO2:  [99 %] 99 %  BP: (111-127)/(72-79) 127/79     Weight: 75.5 kg (166 lb 7.2 oz)  Body mass index is 26.87 kg/m².      Intake/Output Summary (Last 24 hours) at 18 0705  Last data filed at 18 1735   Gross per 24 hour   Intake             2000 ml   Output                0 ml   Net             2000 ml       Significant Labs:  Lab Results   Component Value Date    GROUPTRH O POS 2018    HEPBSAG Negative 2018    STREPBCULT No Group B Streptococcus isolated 2018       Recent Labs  Lab 18  0436   HGB 8.5*   HCT 27.2*        CBC:     Recent Labs  Lab 18  0436   WBC 11.17   RBC 3.60*   HGB 8.5*   HCT 27.2*      MCV 76*   MCH 23.6*   MCHC 31.3*       Physical Exam:   Constitutional: She is oriented to person, place, and time. She appears well-developed and well-nourished. No distress.    HENT:   Head: Normocephalic and atraumatic.      Cardiovascular: Normal rate, regular rhythm and normal heart sounds.     Pulmonary/Chest: Effort normal and breath sounds normal.        Abdominal: Soft. Bowel sounds are normal. She exhibits no distension. There is no tenderness.   Fundus firm below umbilicus              Musculoskeletal: Normal range of motion. She exhibits no edema.       Neurological: She is alert and oriented to person, place, and time.    Skin: Skin is warm and dry.    Psychiatric: She has a normal mood and affect.       Assessment/Plan:     27 y.o. female  for:    *  (spontaneous vaginal delivery)    Postpartum care:  - Patient doing well. Continue routine management and advances.  - Continue PO pain meds. Pain well controlled.  - Encourage ambulation.   - Heme: Pre Delivery h/h  --> Post Delivery h/h   - Circumcision - N/a   - Contraception - SB  - Lactation - The patient is bottle feeding. Lactation nurse following along PRN  - Rh Status - O pos                Iron deficiency anemia    - anemia noted on admit at  to  pp  - will start fe and colace            Disposition: As patient meets milestones, will plan to discharge today.    Ashley Guzmán MD  Obstetrics  [facility]    Doing well, no questions,no problems.  I have reviewed the resident's note, evaluated the patient and agree with the diagnosis and management plan

## 2018-03-06 NOTE — DISCHARGE SUMMARY
"[facility]  Obstetrics  Discharge Summary      Patient Name: Lurdes Day  MRN: [id number]  Admission Date: 3/4/2018  Hospital Length of Stay: 2 days  Discharge Date and Time:  2018 7:07 AM  Attending Physician: Sangita Ochoa DO   Discharging Provider: Ashley Guzmán MD  Primary Care Provider: Primary Doctor No    HPI: Lurdes Day is a 27 y.o. [id number] at 38w3d presents complaining of contractions over the last "few days." Patient presented yesterday morning at 4 cm, discharged after 2 hour rule out labor. Now returns with "feeling like I got to poop." Patient reports contractions are 5 minutes apart. Denies gush of fluid, bleeding, decreased FM.   This IUP is complicated by late to PNC, intermittent PNC, suboptimal dating.  Patient reports good FM.       * No surgery found *     Hospital Course:   2018 - Patient admitted from [facility] at 8cm cervical dilation. Labor progressed without complications. , see delivery note for details.  2018 PPD#1 s/p . Doing well, meeting all pp milestones. Bottle feeding.   2018 PPD#2. Continues to do well. Meeting pp milestones. Desires discharge.         Final Active Diagnoses:    Diagnosis Date Noted POA    PRINCIPAL PROBLEM:   (spontaneous vaginal delivery) [O80] 2016 Not Applicable    Iron deficiency anemia [D50.9] 2018 Unknown      Problems Resolved During this Admission:    Diagnosis Date Noted Date Resolved POA    Indication for care in labor and delivery, antepartum [O75.9] 2018 Yes        Labs:   CBC   Recent Labs  Lab 18  1616 18  0436   WBC 11.86 11.17   HGB 9.8* 8.5*   HCT 31.4* 27.2*    204       Feeding Method: bottle    Immunizations     Date Immunization Status Dose Route/Site Given by    18 MMR Incomplete 0.5 mL Subcutaneous/Left deltoid     18 Tdap Incomplete 0.5 mL Intramuscular/Left deltoid           Delivery:    Episiotomy: None "   Lacerations: 1st   Repair suture:     Repair # of packets: 1   Blood loss (ml): 150     Birth information:  YOB: 2016   Time of birth: 6:16 PM   Sex: female   Delivery type: Vaginal, Spontaneous Delivery   Gestational Age: 38w4d    Delivery Clinician:      Other providers:       Additional  information:  Forceps:    Vacuum:    Breech:    Observed anomalies      Living?:           APGARS  One minute Five minutes Ten minutes   Skin color:         Heart rate:         Grimace:         Muscle tone:         Breathing:         Totals: 9  9        Placenta: Delivered:       appearance    Pending Diagnostic Studies:     None          Discharged Condition: good    Disposition: Home or Self Care    Follow Up:  Follow-up Information     Sangita Ochoa DO. Schedule an appointment as soon as possible for a visit in 6 weeks.    Specialty:  Obstetrics and Gynecology  Why:  post partum visit  Contact information:  [address] [place] [id number]  [phone]                 Patient Instructions:     Diet Adult Regular     Activity as tolerated     Other restrictions (specify):   Order Comments: Nothing in vagina for 6 weeks     Notify your health care provider if you experience any of the following:  temperature >100.4     Notify your health care provider if you experience any of the following:  persistent nausea and vomiting or diarrhea     Notify your health care provider if you experience any of the following:  severe uncontrolled pain     Notify your health care provider if you experience any of the following:  redness, tenderness, or signs of infection (pain, swelling, redness, odor or green/yellow discharge around incision site)     Notify your health care provider if you experience any of the following:  difficulty breathing or increased cough     Notify your health care provider if you experience any of the following:  severe persistent headache     Notify your health care provider if  you experience any of the following:  persistent dizziness, light-headedness, or visual disturbances     Notify your health care provider if you experience any of the following:  increased confusion or weakness     Notify your health care provider if you experience any of the following:   Order Comments: Bleeding through 2 pads/hr for 2 hours       Medications:  Current Discharge Medication List      START taking these medications    Details   ibuprofen (ADVIL,MOTRIN) 600 MG tablet Take 1 tablet (600 mg total) by mouth every 6 (six) hours.  Qty: 30 tablet, Refills: 1             Ashley Guzmán MD  Obstetrics  [facility]    Doing well, no complaints, ready for D/C.

## 2018-04-11 ENCOUNTER — TELEPHONE (OUTPATIENT)
Dept: OBSTETRICS AND GYNECOLOGY | Facility: CLINIC | Age: 28
End: 2018-04-11

## 2018-04-11 NOTE — TELEPHONE ENCOUNTER
Contacted pt and scheduled 6 week pp visit. Scheduled appointment with Dr Fung 8465/[id number] at the [facility] location

## 2018-04-11 NOTE — TELEPHONE ENCOUNTER
----- Message from Vannesa Pillai sent at 4/11/2018  9:28 AM CDT -----  Contact: CARLOS VINSON [[id number]]            Name of Who is Calling: CARLOS VINSON [[id number]]      What is the request in detail: calling to schedule Post Partum appointment       Can the clinic reply by [facility]: no      [facility]: [phone]

## 2018-05-01 ENCOUNTER — TELEPHONE (OUTPATIENT)
Dept: OBSTETRICS AND GYNECOLOGY | Facility: CLINIC | Age: 28
End: 2018-05-01

## 2018-05-01 NOTE — TELEPHONE ENCOUNTER
Thierno, bibi is Debbie I've received your request I'm returning your call from the  clinic at [facility]. I just wanted to let you know that it was ok to come at 2:30 for the appointment with Dr. Ochoa.

## 2018-11-18 ENCOUNTER — HOSPITAL ENCOUNTER (EMERGENCY)
Facility: OTHER | Age: 28
Discharge: HOME | End: 2018-11-18
Attending: EMERGENCY MEDICINE
Payer: MEDICAID

## 2018-11-18 VITALS
SYSTOLIC BLOOD PRESSURE: 116 MMHG | RESPIRATION RATE: 16 BRPM | WEIGHT: 167 LBS | HEART RATE: 70 BPM | BODY MASS INDEX: 26.84 KG/M2 | DIASTOLIC BLOOD PRESSURE: 76 MMHG | TEMPERATURE: 99 F | OXYGEN SATURATION: 100 % | HEIGHT: 66 IN

## 2018-11-18 DIAGNOSIS — O46.92 VAGINAL BLEEDING IN PREGNANCY, SECOND TRIMESTER: Primary | ICD-10-CM

## 2018-11-18 PROBLEM — O46.90 VAGINAL BLEEDING IN PREGNANCY: Status: ACTIVE | Noted: 2018-11-18

## 2018-11-18 LAB
ABO + RH BLD: NORMAL
ALBUMIN SERPL BCP-MCNC: 3.4 G/DL
ALP SERPL-CCNC: 91 U/L
ALT SERPL W/O P-5'-P-CCNC: 8 U/L
ANION GAP SERPL CALC-SCNC: 11 MMOL/L
AST SERPL-CCNC: 18 U/L
B-HCG UR QL: POSITIVE
BACTERIA GENITAL QL WET PREP: ABNORMAL
BACTERIA GENITAL QL WET PREP: NORMAL
BASOPHILS # BLD AUTO: 0.02 K/UL
BASOPHILS NFR BLD: 0.2 %
BILIRUB SERPL-MCNC: 0.9 MG/DL
BILIRUB UR QL STRIP: NEGATIVE
BUN SERPL-MCNC: 5 MG/DL
CALCIUM SERPL-MCNC: 9.5 MG/DL
CHLORIDE SERPL-SCNC: 106 MMOL/L
CLARITY UR: CLEAR
CLUE CELLS VAG QL WET PREP: ABNORMAL
CLUE CELLS VAG QL WET PREP: NORMAL
CO2 SERPL-SCNC: 19 MMOL/L
COLOR UR: YELLOW
CREAT SERPL-MCNC: 0.7 MG/DL
CTP QC/QA: YES
DIFFERENTIAL METHOD: ABNORMAL
EOSINOPHIL # BLD AUTO: 0 K/UL
EOSINOPHIL NFR BLD: 0.4 %
ERYTHROCYTE [DISTWIDTH] IN BLOOD BY AUTOMATED COUNT: 18.2 %
EST. GFR  (AFRICAN AMERICAN): >60 ML/MIN/1.73 M^2
EST. GFR  (NON AFRICAN AMERICAN): >60 ML/MIN/1.73 M^2
FILAMENT FUNGI VAG WET PREP-#/AREA: ABNORMAL
FILAMENT FUNGI VAG WET PREP-#/AREA: NORMAL
GLUCOSE SERPL-MCNC: 72 MG/DL
GLUCOSE UR QL STRIP: NEGATIVE
HCG INTACT+B SERPL-ACNC: NORMAL MIU/ML
HCT VFR BLD AUTO: 34.3 %
HGB BLD-MCNC: 10.7 G/DL
HGB UR QL STRIP: NEGATIVE
KETONES UR QL STRIP: NEGATIVE
LEUKOCYTE ESTERASE UR QL STRIP: NEGATIVE
LYMPHOCYTES # BLD AUTO: 2.8 K/UL
LYMPHOCYTES NFR BLD: 28.3 %
MCH RBC QN AUTO: 22 PG
MCHC RBC AUTO-ENTMCNC: 31.2 G/DL
MCV RBC AUTO: 71 FL
MONOCYTES # BLD AUTO: 0.6 K/UL
MONOCYTES NFR BLD: 6 %
NEUTROPHILS # BLD AUTO: 6.4 K/UL
NEUTROPHILS NFR BLD: 64.8 %
NITRITE UR QL STRIP: NEGATIVE
PH UR STRIP: 7 [PH] (ref 5–8)
PLATELET # BLD AUTO: 350 K/UL
PMV BLD AUTO: 9.7 FL
POTASSIUM SERPL-SCNC: 3.8 MMOL/L
PROT SERPL-MCNC: 8.1 G/DL
PROT UR QL STRIP: NEGATIVE
RBC # BLD AUTO: 4.86 M/UL
SODIUM SERPL-SCNC: 136 MMOL/L
SP GR UR STRIP: 1.01 (ref 1–1.03)
SPECIMEN SOURCE: ABNORMAL
SPECIMEN SOURCE: NORMAL
T VAGINALIS GENITAL QL WET PREP: ABNORMAL
T VAGINALIS GENITAL QL WET PREP: NORMAL
URN SPEC COLLECT METH UR: NORMAL
UROBILINOGEN UR STRIP-ACNC: NEGATIVE EU/DL
WBC # BLD AUTO: 9.87 K/UL
WBC #/AREA VAG WET PREP: ABNORMAL
WBC #/AREA VAG WET PREP: NORMAL
YEAST GENITAL QL WET PREP: ABNORMAL
YEAST GENITAL QL WET PREP: NORMAL

## 2018-11-18 PROCEDURE — 87491 CHLMYD TRACH DNA AMP PROBE: CPT | Mod: 91

## 2018-11-18 PROCEDURE — 81003 URINALYSIS AUTO W/O SCOPE: CPT

## 2018-11-18 PROCEDURE — 84702 CHORIONIC GONADOTROPIN TEST: CPT

## 2018-11-18 PROCEDURE — 96360 HYDRATION IV INFUSION INIT: CPT

## 2018-11-18 PROCEDURE — 25000003 PHARM REV CODE 250: Performed by: PHYSICIAN ASSISTANT

## 2018-11-18 PROCEDURE — 96361 HYDRATE IV INFUSION ADD-ON: CPT

## 2018-11-18 PROCEDURE — 80053 COMPREHEN METABOLIC PANEL: CPT

## 2018-11-18 PROCEDURE — 81025 URINE PREGNANCY TEST: CPT | Performed by: EMERGENCY MEDICINE

## 2018-11-18 PROCEDURE — 86901 BLOOD TYPING SEROLOGIC RH(D): CPT

## 2018-11-18 PROCEDURE — 99284 EMERGENCY DEPT VISIT MOD MDM: CPT | Mod: 25

## 2018-11-18 PROCEDURE — 85025 COMPLETE CBC W/AUTO DIFF WBC: CPT

## 2018-11-18 PROCEDURE — 87210 SMEAR WET MOUNT SALINE/INK: CPT | Mod: 91

## 2018-11-18 RX ADMIN — SODIUM CHLORIDE 1000 ML: 0.9 INJECTION, SOLUTION INTRAVENOUS at 04:11

## 2018-11-18 NOTE — ED PROVIDER NOTES
Encounter Date: 2018       History     Chief Complaint   Patient presents with    Vaginal Bleeding     Pregnant but not sure how many weeks (between 10-14 weeks) and started with dark red bleeding last night and states it is orange today.  She notices it when she wipes only A little bit of cramping.      Patient is 28 year old female  (one miscarriage one SIDS) at unknown gestation who presents with complaints of vaginal bleeding that started last night. She reports that this bleeding has slightly decreased in flow today but admits every time she bears down to have a bowel movement she as increased vaginal bleeding. She reports that she had some lower abdominal cramping yesterday but admits that the pain has improved today. She reports that she has no associated fever, chills, nausea, vomiting, chest pain or SOB. She is currently unaccompanied in the ER. LMP was 2018.           Review of patient's allergies indicates:  No Known Allergies  History reviewed. No pertinent past medical history.  Past Surgical History:   Procedure Laterality Date    HERNIA REPAIR       Family History   Problem Relation Age of Onset    Colon cancer Neg Hx     Hypertension Neg Hx      Social History     Tobacco Use    Smoking status: Never Smoker    Smokeless tobacco: Never Used   Substance Use Topics    Alcohol use: No    Drug use: No     Review of Systems   Constitutional: Negative for fever.   HENT: Negative for sore throat.    Respiratory: Negative for shortness of breath.    Cardiovascular: Negative for chest pain.   Gastrointestinal: Negative for nausea.   Genitourinary: Positive for vaginal bleeding. Negative for dysuria.   Musculoskeletal: Negative for back pain.   Skin: Negative for rash.   Neurological: Negative for weakness.   Hematological: Does not bruise/bleed easily.       Physical Exam     Initial Vitals [18 1442]   BP Pulse Resp Temp SpO2   (!) 132/90 67 17 98.4 °F (36.9 °C) 100 %      MAP        --         Physical Exam    Nursing note and vitals reviewed.  Constitutional: She appears well-developed and well-nourished. She is not diaphoretic. No distress.   Healthy appearing female in NAD or apparent pain. She makes good eye contact speaks in clear full sentences and ambulates with ease.    HENT:   Head: Normocephalic and atraumatic.   Eyes: Conjunctivae and EOM are normal. Pupils are equal, round, and reactive to light. Right eye exhibits no discharge. Left eye exhibits no discharge. No scleral icterus.   Neck: Normal range of motion.   Cardiovascular: Normal rate, regular rhythm, normal heart sounds and intact distal pulses. Exam reveals no gallop and no friction rub.    No murmur heard.  Pulmonary/Chest: Breath sounds normal. No respiratory distress. She has no wheezes. She has no rhonchi. She has no rales.   Abdominal: Soft. Bowel sounds are normal. There is no tenderness. There is no rebound and no guarding.   Benign abdomen    Genitourinary:   Genitourinary Comments: Patient does not tolerate speculum exam or bimanual exam. She states its just too uncomfortable. She has no external lesions.    Musculoskeletal: Normal range of motion. She exhibits no edema or tenderness.   Lymphadenopathy:     She has no cervical adenopathy.   Neurological: She is alert and oriented to person, place, and time. She has normal strength.   Skin: Skin is warm. Capillary refill takes less than 2 seconds. No rash and no abscess noted. No erythema.   Psychiatric: She has a normal mood and affect. Her behavior is normal. Thought content normal.         ED Course   Procedures  Labs Reviewed   POCT URINE PREGNANCY - Abnormal; Notable for the following components:       Result Value    POC Preg Test, Ur Positive (*)     All other components within normal limits   URINALYSIS, REFLEX TO URINE CULTURE    Narrative:     Preferred Collection Type->Urine, Clean Catch           Imaging Results          US OB More Than 14 Wks First  Gestation (Final result)  Result time 11/18/18 17:47:45   Procedure changed from US OB Less Than 14 Wks with Transvag(xpd     Final result by Huey Sesay MD (11/18/18 17:47:45)                 Impression:      Single live intrauterine gestation corresponding to 18 weeks and 0 days.  Expected date of delivery of 04/21/2019.  Apparent short cervix (2.8 cm) on the transabdominal examination.  Outpatient follow-up is suggested.      Electronically signed by: Huey Sesay MD  Date:    11/18/2018  Time:    17:47             Narrative:    EXAMINATION:  US OB GREATER THAN 14 WEEKS FIRST GESTATION    CLINICAL HISTORY:  Vag Bleeding;    TECHNIQUE:  Limited transabdominal pelvic ultrasound was performed.    COMPARISON:  None.    FINDINGS:  There is a single live intrauterine gestation with current cephalic presentation.  The average ultrasound age as measured by the femur length is 18 weeks and 0 days.  The expected date of delivery is 04/21/2019.  The fetal heart rate is 165 beats per minute.  The placenta is located anteriorly.  The amniotic fluid appears adequate on visual assessment.  The cervix is closed.  The cervical length is slightly short on this transabdominal examination measuring 2.8 cm.                              Labs Reviewed   CBC W/ AUTO DIFFERENTIAL - Abnormal; Notable for the following components:       Result Value    Hemoglobin 10.7 (*)     Hematocrit 34.3 (*)     MCV 71 (*)     MCH 22.0 (*)     MCHC 31.2 (*)     RDW 18.2 (*)     All other components within normal limits   COMPREHENSIVE METABOLIC PANEL - Abnormal; Notable for the following components:    CO2 19 (*)     BUN, Bld 5 (*)     Albumin 3.4 (*)     ALT 8 (*)     All other components within normal limits   POCT URINE PREGNANCY - Abnormal; Notable for the following components:    POC Preg Test, Ur Positive (*)     All other components within normal limits   C. TRACHOMATIS/N. GONORRHOEAE BY AMP DNA   URINALYSIS, REFLEX TO URINE CULTURE     Narrative:     Preferred Collection Type->Urine, Clean Catch   HCG, QUANTITATIVE, PREGNANCY   VAGINAL SCREEN   GROUP & RH          Medical Decision Making:   ED Management:  Urgent evaluation a 28-year-old female who presents with complaints of vaginal bleeding in 2nd trimester.  She is afebrile, nontoxic appearing, hemodynamically stable. Physical exam outlined above and reveals no abdominal tenderness to palpation.  Initial pelvic exam is unable to be performed because patient does not tolerate speculum or bimanual exam.  Ultrasound reveals 18 week 0 day fetus with good fetal heart tones.  Diagnostic labs reveal non acute anemia with H&H of 10 and 34.  There is no significant electrolyte abnormalities [place] [address] and Rh is positive. Urinalysis reveals no evidence of infection.  Because patient is much further along than she had suspected I will discuss case with on-call OBGYN.  I am concerned that she did not allow me to visualize her cervix or to assess her reported bleeding thus far along in pregnancy.    6:00 PM discussed case with on-call OBGYN Dr. Drummond who comes down to perform a speculum exam after discussing the importance with the patient.  Dr. Drummond speculum exam reveals no vaginal bleeding with normal cervix and no evidence of bulging membranes.  Patient has no pain on bimanual exam.  Repeat vaginal and gonorrhea and chlamydia cultures are obtained with only evidence of a few clue cells.  No Trichomonas.  Gonorrhea Chlamydia pending.  No recommendation for empirical treatment at this time.  Dr. Drummond feels patient is safe for discharge with instruction to establish prenatal care at the [facility].  Patient is educated on ED return precautions as well as bleeding precautions and verbalized understanding.  She is safe for discharge. Case discussed with attending physician who agrees with planning.  Other:   I have discussed this case with another health care provider.       <> Summary of  the Discussion: Bambi Drummond                      Clinical Impression:   The encounter diagnosis was Vaginal bleeding in pregnancy, second trimester.                             Krys Pereira PA-C  11/18/18 1957

## 2018-11-18 NOTE — ED NOTES
Pt has c/o vaginal bleeding x2 days, pt reports hse is around 12 weeks pregnant. LMP 8/21. Pt reports she has been spotting the past few days. Pt c/o mild weakness and intermittent cramping. . Pt denies any sob or dizziness. Pt is AAOx4. Rr are even and unlabored. Skin is warm and dry.

## 2018-11-19 LAB
C TRACH DNA SPEC QL NAA+PROBE: NOT DETECTED
C TRACH DNA SPEC QL NAA+PROBE: NOT DETECTED
N GONORRHOEA DNA SPEC QL NAA+PROBE: NOT DETECTED
N GONORRHOEA DNA SPEC QL NAA+PROBE: NOT DETECTED

## 2018-11-19 NOTE — HPI
"28 y.o.  AAF presents with 1 day history of Vaginal bleeding. Patient is roughly 18 wk pregnant by LMP. States LMP 18. Has not prenatal care this pregnancy. States noted bright red bleeding when "bearing down to have BM" yesterday. Denies bleeding from rectum. Denies constipation or diarrhea. Did have hemorrhoids with past pregnancy. Describes the blood as bright red. Denies vaginal bleeding today, except "orange discharge when I wipe". Denies bleeding after intercourse. Denies any abdominal pain or cramping. Denies any fever, chills, nausea, vomiting. This pregnancy complicated by lack of prenatal care, h/o of lack of prenatal care.   "

## 2018-11-19 NOTE — CONSULTS
"[facility]  Obstetrics  Consult Note    Patient Name: Lurdes Vinson  MRN: [id number]  Admission Date: 2018  Hospital Length of Stay: 0 days  Code Status: Prior  Primary Care Provider: Primary Doctor No  Principal Problem: Vaginal bleeding in pregnancy    Consults  Subjective:     Principal Problem:Vaginal bleeding in pregnancy    History of Present Illness:  28 y.o.  AAF presents with 1 day history of Vaginal bleeding. Patient is roughly 18 wk pregnant by LMP. States LMP 18. Has not prenatal care this pregnancy. States noted bright red bleeding when "bearing down to have BM" yesterday. Denies bleeding from rectum. Denies constipation or diarrhea. Did have hemorrhoids with past pregnancy. Describes the blood as bright red. Denies vaginal bleeding today, except "orange discharge when I wipe". Denies bleeding after intercourse. Denies any abdominal pain or cramping. Denies any fever, chills, nausea, vomiting. This pregnancy complicated by lack of prenatal care, h/o of lack of prenatal care.     Obstetric HPI:    Obstetric History       T4      L3     SAB1   TAB0   Ectopic0   Multiple0   Live Births4       # Outcome Date GA Lbr Leif/2nd Weight Sex Delivery Anes PTL Lv   6 Current            5 Term 18 38w4d  3.374 kg (7 lb 7 oz) F Vag-Spont Spinal, EPI N [place]      Name: KEISHA VINSON      Apgar1:  9                Apgar5: 9   4 Term 16 40w2d  3.74 kg (8 lb 3.9 oz) M Vag-Spont EPI N [place]      Name: BEAU VINSON      Apgar1:  9                Apgar5: 9   3 Term 10/26/14 39w2d / 00:19 2.801 kg (6 lb 2.8 oz) F Vag-Spont EPI N DEC      Apgar1:  9                Apgar5: 9   2 SAB 13           1 Term 12 39w0d  3.317 kg (7 lb 5 oz) F Vag-Spont EPI  [place]      Name: journey        History reviewed. No pertinent past medical history.  Past Surgical History:   Procedure Laterality Date    HERNIA REPAIR           (Not in a hospital admission)    Review " of patient's allergies indicates:  No Known Allergies     Family History     None        Tobacco Use    Smoking status: Never Smoker    Smokeless tobacco: Never Used   Substance and Sexual Activity    Alcohol use: No    Drug use: No    Sexual activity: Yes     Partners: Male     Birth control/protection: None     Review of Systems   Constitutional: Negative for activity change, appetite change, chills and fever.   Respiratory: Negative for cough and shortness of breath.    Cardiovascular: Negative for leg swelling.   Gastrointestinal: Negative for abdominal pain and vomiting.   Genitourinary: Positive for vaginal bleeding. Negative for dysuria and vaginal discharge.   Neurological: Negative for headaches.   Hematological: Does not bruise/bleed easily.      Objective:     Vital Signs (Most Recent):  Temp: 98.5 °F (36.9 °C) (11/18/18 1912)  Pulse: 70 (11/18/18 1912)  Resp: 16 (11/18/18 1912)  BP: 116/76 (11/18/18 1912)  SpO2: 100 % (11/18/18 1912) Vital Signs (24h Range):  Temp:  [98.4 °F (36.9 °C)-98.9 °F (37.2 °C)] 98.5 °F (36.9 °C)  Pulse:  [67-76] 70  Resp:  [16-18] 16  SpO2:  [99 %-100 %] 100 %  BP: (116-132)/(76-90) 116/76     Weight: 75.8 kg (167 lb)  Body mass index is 26.95 kg/m².    FHT: 165    Physical Exam:   Constitutional: She is oriented to person, place, and time. She appears well-developed and well-nourished. No distress.    HENT:   Head: Normocephalic and atraumatic.      Cardiovascular: Normal rate.     Pulmonary/Chest: Effort normal. No respiratory distress.        Abdominal: Soft. She exhibits no distension. There is no tenderness.   Roughly 18 wk size uterus.      Genitourinary:   Genitourinary Comments: Normal external female genitalia, normal hair distribution. Vaginal mucosa pink, moist, well rugated, scant white physiologic discharge. No blood in vault. Cervix pink, non-friable, without lesion. No CMT. Uterus non tender, roughly 18 week size.              Musculoskeletal: Normal range of  motion. She exhibits no edema.       Neurological: She is alert and oriented to person, place, and time.    Skin: Skin is warm and dry.    Psychiatric: She has a normal mood and affect. Her behavior is normal. Judgment and thought content normal.       Cervix:  Dilation:  0  Effacement:  0%       Significant Labs:  Lab Results   Component Value Date    GROUPTRH O POS 2018    HEPBSAG Negative 2018    STREPBCULT No Group B Streptococcus isolated 2018       CBC:   Recent Labs   Lab 18  1619   WBC 9.87   RBC 4.86   HGB 10.7*   HCT 34.3*      MCV 71*   MCH 22.0*   MCHC 31.2*     I have personallly reviewed all pertinent lab results from the last 24 hours.    Assessment/Plan:     28 y.o. female  at Unknown for:    * Vaginal bleeding in pregnancy    - no vaginal bleeding appreciated on exam today  - cervix closed on exam   - confirmed FHTs 165  - anterior placenta via US in ED, 18 wk based off of femur length in ED  - GC/CT and affirm collected  - H/H 10/34  - VSS, afebrile, non toxic appearing  - Stable for discharge  - Urged patient to make prenatal apt  - message sent to [facility] pool to have patient f/u.   - ED, bleeding, infection precautions discussed with patient.          Thank you for your consult. I will sign off. Please contact us if you have any additional questions.    Ashley Guzmán MD  Obstetrics & Gynecology  [facility]

## 2018-11-19 NOTE — SUBJECTIVE & OBJECTIVE
Obstetric HPI:    Obstetric History       T4      L3     SAB1   TAB0   Ectopic0   Multiple0   Live Births4       # Outcome Date GA Lbr Leif/2nd Weight Sex Delivery Anes PTL Lv   6 Current            5 Term 18 38w4d  3.374 kg (7 lb 7 oz) F Vag-Spont Spinal, EPI N [place]      Name: KEISHA VINSON      Apgar1:  9                Apgar5: 9   4 Term 16 40w2d  3.74 kg (8 lb 3.9 oz) M Vag-Spont EPI N [place]      Name: KAREL, BEAU GONZALEZ      Apgar1:  9                Apgar5: 9   3 Term 10/26/14 39w2d / 00:19 2.801 kg (6 lb 2.8 oz) F Vag-Spont EPI N DEC      Apgar1:  9                Apgar5: 9   2 SAB 13           1 Term 12 39w0d  3.317 kg (7 lb 5 oz) F Vag-Spont EPI  [place]      Name: journey        History reviewed. No pertinent past medical history.  Past Surgical History:   Procedure Laterality Date    HERNIA REPAIR           (Not in a hospital admission)    Review of patient's allergies indicates:  No Known Allergies     Family History     None        Tobacco Use    Smoking status: Never Smoker    Smokeless tobacco: Never Used   Substance and Sexual Activity    Alcohol use: No    Drug use: No    Sexual activity: Yes     Partners: Male     Birth control/protection: None     Review of Systems   Constitutional: Negative for activity change, appetite change, chills and fever.   Respiratory: Negative for cough and shortness of breath.    Cardiovascular: Negative for leg swelling.   Gastrointestinal: Negative for abdominal pain and vomiting.   Genitourinary: Positive for vaginal bleeding. Negative for dysuria and vaginal discharge.   Neurological: Negative for headaches.   Hematological: Does not bruise/bleed easily.      Objective:     Vital Signs (Most Recent):  Temp: 98.5 °F (36.9 °C) (18)  Pulse: 70 (18)  Resp: 16 (18)  BP: 116/76 (18)  SpO2: 100 % (18) Vital Signs (24h Range):  Temp:  [98.4 °F (36.9 °C)-98.9 °F (37.2 °C)] 98.5 °F  (36.9 °C)  Pulse:  [67-76] 70  Resp:  [16-18] 16  SpO2:  [99 %-100 %] 100 %  BP: (116-132)/(76-90) 116/76     Weight: 75.8 kg (167 lb)  Body mass index is 26.95 kg/m².    FHT: 165    Physical Exam:   Constitutional: She is oriented to person, place, and time. She appears well-developed and well-nourished. No distress.    HENT:   Head: Normocephalic and atraumatic.      Cardiovascular: Normal rate.     Pulmonary/Chest: Effort normal. No respiratory distress.        Abdominal: Soft. She exhibits no distension. There is no tenderness.   Roughly 18 wk size uterus.      Genitourinary:   Genitourinary Comments: Normal external female genitalia, normal hair distribution. Vaginal mucosa pink, moist, well rugated, scant white physiologic discharge. No blood in vault. Cervix pink, non-friable, without lesion. No CMT. Uterus non tender, roughly 18 week size.              Musculoskeletal: Normal range of motion. She exhibits no edema.       Neurological: She is alert and oriented to person, place, and time.    Skin: Skin is warm and dry.    Psychiatric: She has a normal mood and affect. Her behavior is normal. Judgment and thought content normal.       Cervix:  Dilation:  0  Effacement:  0%       Significant Labs:  Lab Results   Component Value Date    GROUPTRH O POS 11/18/2018    HEPBSAG Negative 02/19/2018    STREPBCULT No Group B Streptococcus isolated 01/18/2018       CBC:   Recent Labs   Lab 11/18/18  1619   WBC 9.87   RBC 4.86   HGB 10.7*   HCT 34.3*      MCV 71*   MCH 22.0*   MCHC 31.2*     I have personallly reviewed all pertinent lab results from the last 24 hours.

## 2018-11-19 NOTE — ASSESSMENT & PLAN NOTE
- no vaginal bleeding appreciated on exam today  - cervix closed on exam   - confirmed FHTs 165  - anterior placenta via US in ED   - GC/CT and affirm collected  - H/H 10/34  - VSS, afebrile, non toxic appearing  - Stable for discharge  - Urged patient to make prenatal apt  - message sent to [facility] pool to have patient f/u.   - ED, bleeding, infection precautions discussed with patient.

## 2019-01-31 ENCOUNTER — TELEPHONE (OUTPATIENT)
Dept: OBSTETRICS AND GYNECOLOGY | Facility: CLINIC | Age: 29
End: 2019-01-31

## 2019-01-31 NOTE — TELEPHONE ENCOUNTER
----- Message from Christopher Tdaeo sent at 1/31/2019 10:59 AM CST -----  Contact: CARLOS VINSON [[id number]]  Name of Who is Calling: CARLOS VINSON [[id number]]      What is the request in detail: Patient 6 months OB, has not have any prenatal care for this pregnancy and would like to be seen by provider. Please advise      Can the clinic reply by [facility]: no      [facility] [phone]          Left message for patient to give the office a call back.

## 2019-01-31 NOTE — TELEPHONE ENCOUNTER
----- Message from Asif Gabriel sent at 1/31/2019  4:11 PM CST -----  ISIDRA FRANCES RETURNING YOUR CALL [phone]        Schedule patient to be seen soon since its patient first ob visit. Patient verbalized and understand

## 2019-02-04 ENCOUNTER — TELEPHONE (OUTPATIENT)
Dept: OBSTETRICS AND GYNECOLOGY | Facility: CLINIC | Age: 29
End: 2019-02-04

## 2019-02-04 NOTE — TELEPHONE ENCOUNTER
----- Message from Karime Clifton sent at 2/4/2019 10:09 AM CST -----  Contact: Pt  Name of Who is Calling:  CARLOS VINSON [[id number]]    What is the request in detail:Pt is requesting a call back to rechedule her appt . Please Advise      Can the clinic reply by [facility]:Y      [facility][phone]

## 2019-03-29 ENCOUNTER — HOSPITAL ENCOUNTER (EMERGENCY)
Facility: OTHER | Age: 29
Discharge: HOME | End: 2019-03-29
Attending: OBSTETRICS & GYNECOLOGY
Payer: MEDICAID

## 2019-03-29 VITALS
OXYGEN SATURATION: 100 % | DIASTOLIC BLOOD PRESSURE: 79 MMHG | SYSTOLIC BLOOD PRESSURE: 112 MMHG | RESPIRATION RATE: 15 BRPM | TEMPERATURE: 99 F | HEART RATE: 107 BPM

## 2019-03-29 DIAGNOSIS — O47.9 FALSE LABOR: ICD-10-CM

## 2019-03-29 DIAGNOSIS — Z3A.36 36 WEEKS GESTATION OF PREGNANCY: Primary | ICD-10-CM

## 2019-03-29 DIAGNOSIS — O09.33 INSUFFICIENT PRENATAL CARE IN THIRD TRIMESTER: ICD-10-CM

## 2019-03-29 LAB
ABDOMINAL CIRCUMFERENCE: 31.83 CM
BASOPHILS # BLD AUTO: 0.02 K/UL (ref 0–0.2)
BASOPHILS NFR BLD: 0.2 % (ref 0–1.9)
BIPARIETAL DIAMETER: NORMAL CM
DIFFERENTIAL METHOD: ABNORMAL
EOSINOPHIL # BLD AUTO: 0 K/UL (ref 0–0.5)
EOSINOPHIL NFR BLD: 0.3 % (ref 0–8)
ERYTHROCYTE [DISTWIDTH] IN BLOOD BY AUTOMATED COUNT: 16.3 % (ref 11.5–14.5)
ESTIMATED FETAL WEIGHT: NORMAL GRAMS
FEMUR LENGTH: 7.12
HC/AC: NORMAL
HCT VFR BLD AUTO: 33.2 % (ref 37–48.5)
HEAD CIRCUMFERENCE: NORMAL CM
HGB BLD-MCNC: 10.3 G/DL (ref 12–16)
HIV1+2 IGG SERPL QL IA.RAPID: NEGATIVE
LYMPHOCYTES # BLD AUTO: 2.7 K/UL (ref 1–4.8)
LYMPHOCYTES NFR BLD: 23.8 % (ref 18–48)
MCH RBC QN AUTO: 22.6 PG (ref 27–31)
MCHC RBC AUTO-ENTMCNC: 31 G/DL (ref 32–36)
MCV RBC AUTO: 73 FL (ref 82–98)
MONOCYTES # BLD AUTO: 0.8 K/UL (ref 0.3–1)
MONOCYTES NFR BLD: 6.8 % (ref 4–15)
NEUTROPHILS # BLD AUTO: 7.8 K/UL (ref 1.8–7.7)
NEUTROPHILS NFR BLD: 68.5 % (ref 38–73)
PLATELET # BLD AUTO: 288 K/UL (ref 150–350)
PMV BLD AUTO: 10.5 FL (ref 9.2–12.9)
RBC # BLD AUTO: 4.56 M/UL (ref 4–5.4)
WBC # BLD AUTO: 11.42 K/UL (ref 3.9–12.7)

## 2019-03-29 PROCEDURE — 59025 PR FETAL 2N-STRESS TEST: ICD-10-PCS | Mod: 26,59,, | Performed by: OBSTETRICS & GYNECOLOGY

## 2019-03-29 PROCEDURE — 76815 OB US LIMITED FETUS(S): CPT | Mod: 26,,, | Performed by: OBSTETRICS & GYNECOLOGY

## 2019-03-29 PROCEDURE — 87340 HEPATITIS B SURFACE AG IA: CPT

## 2019-03-29 PROCEDURE — 86592 SYPHILIS TEST NON-TREP QUAL: CPT

## 2019-03-29 PROCEDURE — 85025 COMPLETE CBC W/AUTO DIFF WBC: CPT

## 2019-03-29 PROCEDURE — 87081 CULTURE SCREEN ONLY: CPT

## 2019-03-29 PROCEDURE — 76815 PR  US,PREGNANT UTERUS,LIMITED, 1/> FETUSES: ICD-10-PCS | Mod: 26,,, | Performed by: OBSTETRICS & GYNECOLOGY

## 2019-03-29 PROCEDURE — 99284 EMERGENCY DEPT VISIT MOD MDM: CPT | Mod: 25,,, | Performed by: OBSTETRICS & GYNECOLOGY

## 2019-03-29 PROCEDURE — 86703 HIV-1/HIV-2 1 RESULT ANTBDY: CPT

## 2019-03-29 PROCEDURE — 59025 FETAL NON-STRESS TEST: CPT | Mod: 59

## 2019-03-29 PROCEDURE — 99284 PR EMERGENCY DEPT VISIT,LEVEL IV: ICD-10-PCS | Mod: 25,,, | Performed by: OBSTETRICS & GYNECOLOGY

## 2019-03-29 PROCEDURE — 86703 HIV-1/HIV-2 1 RESULT ANTBDY: CPT | Mod: 91

## 2019-03-29 PROCEDURE — 59025 FETAL NON-STRESS TEST: CPT | Mod: 26,59,, | Performed by: OBSTETRICS & GYNECOLOGY

## 2019-03-29 PROCEDURE — 99284 EMERGENCY DEPT VISIT MOD MDM: CPT | Mod: 25

## 2019-03-29 PROCEDURE — 76815 OB US LIMITED FETUS(S): CPT

## 2019-03-29 PROCEDURE — 86762 RUBELLA ANTIBODY: CPT

## 2019-03-30 NOTE — ED PROVIDER NOTES
Encounter Date: 3/29/2019       History     Chief Complaint   Patient presents with    Contractions     HPI   Lurdes Day is a 28 y.o. [id number] at 36w5d presents complaining of pelvic pressure. Pt has had no PNC this pregnancy. Stated that she knew she was close to her due date and wanted to make sure that she wasn't in labor. Pt reports she did not follow up with an OBGYN after finding out that she was pregnant because she has been very tired and busy taking care of her other children.   This IUP is complicated by no prenatal care, anemia.  Patient reports contractions, denies vaginal bleeding, denies LOF.   Fetal Movement: normal.     Review of patient's allergies indicates:  No Known Allergies  No past medical history on file.  Past Surgical History:   Procedure Laterality Date    HERNIA REPAIR       Family History   Problem Relation Age of Onset    Colon cancer Neg Hx     Hypertension Neg Hx      Social History     Tobacco Use    Smoking status: Never Smoker    Smokeless tobacco: Never Used   Substance Use Topics    Alcohol use: No    Drug use: No     Review of Systems   Constitutional: Negative for chills and fever.   HENT: Negative for postnasal drip, rhinorrhea, sinus pressure and sore throat.    Eyes: Negative for photophobia and visual disturbance.   Respiratory: Negative for cough and shortness of breath.    Cardiovascular: Negative for chest pain and palpitations.   Gastrointestinal: Negative for nausea and vomiting.   Genitourinary: Negative for dysuria, frequency and urgency.   Musculoskeletal: Negative for back pain.   Skin: Negative for pallor and rash.   Neurological: Negative for dizziness, light-headedness and headaches.   Psychiatric/Behavioral: The patient is not nervous/anxious.        Physical Exam     Initial Vitals   BP Pulse Resp Temp SpO2   19 1850 19 1850 19 1852 19 1852 19 185   (!) 134/92 90 18 98.6 °F (37 °C) 100 %      MAP       --                 Physical Exam    Constitutional: She appears well-developed and well-nourished. She is not diaphoretic. No distress.   HENT:   Head: Normocephalic and atraumatic.   Cardiovascular: Normal rate, regular rhythm, normal heart sounds and intact distal pulses.   Pulmonary/Chest: Breath sounds normal.   Abdominal: Soft. She exhibits no distension. There is no tenderness. There is no rebound.   Neurological: She is alert and oriented to person, place, and time. She has normal strength. No sensory deficit.   Skin: Skin is warm and dry.   Psychiatric: She has a normal mood and affect. Her behavior is normal. Judgment and thought content normal.     OB LABOR EXAM:       Method: Sterile vaginal exam per MD.       Dilatation: 3.   Station: -3.   Effacement: 50%.             ED Course   Obtain Fetal nonstress test (NST)  Date/Time: 3/30/2019 12:15 AM  Performed by: Tram Almonte MD  Authorized by: Tram Almonte MD     Nonstress Test:     Variability:  6-25 BPM    Decelerations:  None    Accelerations:  15 bpm    Baseline:  145    Contractions:  Irregular  Biophysical Profile:     Nonstress Test Interpretation: reactive      Overall Impression:  Reassuring      Labs Reviewed   STREP B SCREEN, VAGINAL / RECTAL   CBC W/ AUTO DIFFERENTIAL   RAPID HIV   HIV 1 / 2 ANTIBODY   RPR   HEPATITIS B SURFACE ANTIGEN   RUBELLA ANTIBODY, IGG          Imaging Results    None          Medical Decision Making:   ED Management:  - Initial BP mild range, subsequent all normal - no e/o PreE  - NST reactive and reassuring, irregular contractions on toco  - SVE: 3/50/-3, multiparous, non-labored  - fetal biometry consistent with stated LMP/dates ~36 weeks  - GBS, 1T and 3T labs collected  - pt extensively counseled on necessity of following up in clinic  - medicaid tubal consents signed  - labor precautions given              Attending Attestation:   Physician Attestation Statement for Resident:  As the supervising MD   Physician  Attestation Statement: I have personally seen and examined this patient.   I agree with the above history. -:   As the supervising MD I agree with the above PE.    As the supervising MD I agree with the above treatment, course, plan, and disposition.  I was personally present during the entire procedure.  I have reviewed and agree with the residents interpretation of the following: rhythm strips.  I have reviewed the following: old records at this facility.                    ED Course as of Mar 30 0014   Fri Mar 29, 2019   1940 Pt was counseled regarding bilateral tubal ligation and consents were signed while in [facility]    [LB]   1946 Multiparous cervix, no evidence of labor    [LB]      ED Course User Index  [LB] Casi Colon MD     Clinical Impression:       ICD-10-CM ICD-9-CM   1. 36 weeks gestation of pregnancy Z3A.36 V22.2   2. Insufficient prenatal care in third trimester O09.33 V23.7   3. False labor O47.9 644.10                                Tram Almonte MD  Resident  03/30/19 0021       Casi Colon MD  03/30/19 0213

## 2019-03-30 NOTE — DISCHARGE INSTRUCTIONS
Please come in to [facility] if you experience the following:  Bag of water breaks (a big gush, or a small trickle that fills up a pad in less than 1 hour)  Vaginal bleeding  Decreased fetal movement  Contractions that are approximately 3-5 minutes apart    You can always call the [facility] nurse phone number at any time--day or night--if you have any questions about your health or your baby's health. The labor and delivery nurse phone number is [phone].     Please make an appointment with the [facility] for Monday, 4/1/19. Tell the clinic when you call that you were seen by an OBGYN in the [facility], and that the doctor recommended you follow up in the clinic on Monday, 4/1/19. The phone number for the clinic is [phone].

## 2019-04-01 ENCOUNTER — TELEPHONE (OUTPATIENT)
Dept: OBSTETRICS AND GYNECOLOGY | Facility: CLINIC | Age: 29
End: 2019-04-01

## 2019-04-01 LAB
BACTERIA SPEC AEROBE CULT: NORMAL
HBV SURFACE AG SERPL QL IA: NEGATIVE
HIV 1+2 AB+HIV1 P24 AG SERPL QL IA: NEGATIVE
RPR SER QL: NORMAL
RUBV IGG SER-ACNC: 40.4 IU/ML
RUBV IGG SER-IMP: REACTIVE

## 2019-04-01 NOTE — TELEPHONE ENCOUNTER
Pt requesting appt today or tomorrow, offered appt this afternoon, states can not come in at that time. Requests tomorrow morning, scheduled

## 2019-04-01 NOTE — TELEPHONE ENCOUNTER
----- Message from Yanet Smith sent at 4/1/2019  8:33 AM CDT -----  Contact: Self      Can the clinic reply in [facility]: CELESTINE    Who Called: CARLOS VINSON [[id number]]    Date of Positive Preg Test: N/a    1st day of Last Menstrual Cycle: N/a    List Any Difficulties: Pt is 3 centimeters dilated and went to the ED on 03/29.     What Number to Call Back: [phone]

## 2019-04-02 ENCOUNTER — ROUTINE PRENATAL (OUTPATIENT)
Dept: OBSTETRICS AND GYNECOLOGY | Facility: CLINIC | Age: 29
End: 2019-04-02
Payer: MEDICAID

## 2019-04-02 ENCOUNTER — HOSPITAL ENCOUNTER (EMERGENCY)
Facility: OTHER | Age: 29
Discharge: HOME | End: 2019-04-02
Attending: OBSTETRICS & GYNECOLOGY
Payer: MEDICAID

## 2019-04-02 VITALS
HEART RATE: 100 BPM | DIASTOLIC BLOOD PRESSURE: 84 MMHG | TEMPERATURE: 98 F | SYSTOLIC BLOOD PRESSURE: 131 MMHG | RESPIRATION RATE: 18 BRPM | OXYGEN SATURATION: 97 %

## 2019-04-02 VITALS
BODY MASS INDEX: 26.26 KG/M2 | SYSTOLIC BLOOD PRESSURE: 128 MMHG | WEIGHT: 162.69 LBS | DIASTOLIC BLOOD PRESSURE: 88 MMHG

## 2019-04-02 DIAGNOSIS — O47.9 UTERINE CONTRACTIONS DURING PREGNANCY: Primary | ICD-10-CM

## 2019-04-02 DIAGNOSIS — O09.33 NO PRENATAL CARE IN CURRENT PREGNANCY IN THIRD TRIMESTER: ICD-10-CM

## 2019-04-02 DIAGNOSIS — O09.30 LATE PRENATAL CARE: ICD-10-CM

## 2019-04-02 DIAGNOSIS — Z34.93 PREGNANCY WITH ONE FETUS IN THIRD TRIMESTER: Primary | ICD-10-CM

## 2019-04-02 DIAGNOSIS — Z3A.37 37 WEEKS GESTATION OF PREGNANCY: ICD-10-CM

## 2019-04-02 PROBLEM — O46.90 VAGINAL BLEEDING IN PREGNANCY: Status: RESOLVED | Noted: 2018-11-18 | Resolved: 2019-04-02

## 2019-04-02 PROCEDURE — 99999 PR PBB SHADOW E&M-EST. PATIENT-LVL II: CPT | Mod: PBBFAC,,, | Performed by: STUDENT IN AN ORGANIZED HEALTH CARE EDUCATION/TRAINING PROGRAM

## 2019-04-02 PROCEDURE — 99284 EMERGENCY DEPT VISIT MOD MDM: CPT | Mod: 25,,, | Performed by: OBSTETRICS & GYNECOLOGY

## 2019-04-02 PROCEDURE — 59025 FETAL NON-STRESS TEST: CPT | Mod: 26,,, | Performed by: OBSTETRICS & GYNECOLOGY

## 2019-04-02 PROCEDURE — 59025 PR FETAL 2N-STRESS TEST: ICD-10-PCS | Mod: 26,,, | Performed by: OBSTETRICS & GYNECOLOGY

## 2019-04-02 PROCEDURE — 99284 PR EMERGENCY DEPT VISIT,LEVEL IV: ICD-10-PCS | Mod: 25,,, | Performed by: OBSTETRICS & GYNECOLOGY

## 2019-04-02 PROCEDURE — 99999 PR PBB SHADOW E&M-EST. PATIENT-LVL II: ICD-10-PCS | Mod: PBBFAC,,, | Performed by: STUDENT IN AN ORGANIZED HEALTH CARE EDUCATION/TRAINING PROGRAM

## 2019-04-02 PROCEDURE — 99213 PR OFFICE/OUTPT VISIT, EST, LEVL III, 20-29 MIN: ICD-10-PCS | Mod: TH,S$PBB,, | Performed by: STUDENT IN AN ORGANIZED HEALTH CARE EDUCATION/TRAINING PROGRAM

## 2019-04-02 PROCEDURE — 99212 OFFICE O/P EST SF 10 MIN: CPT | Mod: PBBFAC,TH,PO | Performed by: STUDENT IN AN ORGANIZED HEALTH CARE EDUCATION/TRAINING PROGRAM

## 2019-04-02 PROCEDURE — 59025 FETAL NON-STRESS TEST: CPT

## 2019-04-02 PROCEDURE — 99213 OFFICE O/P EST LOW 20 MIN: CPT | Mod: TH,S$PBB,, | Performed by: STUDENT IN AN ORGANIZED HEALTH CARE EDUCATION/TRAINING PROGRAM

## 2019-04-02 PROCEDURE — 99284 EMERGENCY DEPT VISIT MOD MDM: CPT | Mod: 25,27

## 2019-04-02 NOTE — ED PROVIDER NOTES
Encounter Date: 2019       History     Chief Complaint   Patient presents with    Contractions     Lurdes Day is a 28 y.o. [id number] at 37w2d presents complaining of contractions. Irregular every 2-3 mins apart. Has been having them for a couple of days. Was sent over from clinic for r/o labor.   This IUP is complicated by complete lack of PNC, grand multip  Patient reports contractions, denies vaginal bleeding, denies LOF.   Fetal Movement: normal.          Review of patient's allergies indicates:  No Known Allergies  No past medical history on file.  Past Surgical History:   Procedure Laterality Date    HERNIA REPAIR       Family History   Problem Relation Age of Onset    Colon cancer Neg Hx     Hypertension Neg Hx      Social History     Tobacco Use    Smoking status: Never Smoker    Smokeless tobacco: Never Used   Substance Use Topics    Alcohol use: No    Drug use: No     Review of Systems   Constitutional: Negative for chills, fatigue and fever.   HENT: Negative for congestion.    Eyes: Negative for visual disturbance.   Respiratory: Negative for cough and shortness of breath.    Cardiovascular: Negative for chest pain and palpitations.   Gastrointestinal: Positive for abdominal pain. Negative for abdominal distention, constipation, diarrhea, nausea and vomiting.   Genitourinary: Negative for difficulty urinating, dysuria, hematuria, vaginal bleeding and vaginal discharge.   Skin: Negative for rash.   Neurological: Negative for dizziness, seizures, light-headedness and headaches.   Hematological: Does not bruise/bleed easily.   Psychiatric/Behavioral: Negative for dysphoric mood. The patient is not nervous/anxious.        Physical Exam     Initial Vitals [19 1643]   BP Pulse Resp Temp SpO2   133/89 93 18 97.9 °F (36.6 °C) 97 %      MAP       --         Physical Exam    Vitals reviewed.  Constitutional: She appears well-developed and well-nourished. She is not diaphoretic. No distress.    HENT:   Head: Normocephalic and atraumatic.   Eyes: Conjunctivae are normal. Right eye exhibits no discharge. Left eye exhibits no discharge.   Neck: Neck supple.   Cardiovascular: Normal rate and regular rhythm.   Pulmonary/Chest: Breath sounds normal. No respiratory distress.   Abdominal: Soft. She exhibits no distension. There is no tenderness. There is no rebound.   Gravid, fundus NT   Musculoskeletal: She exhibits no edema.   Neurological: She is alert and oriented to person, place, and time.   Skin: Skin is warm and dry. No rash noted. No erythema.   Psychiatric: She has a normal mood and affect. Her behavior is normal. Judgment and thought content normal.     OB LABOR EXAM:             Dilatation: 4.   Station: -3.   Effacement: 60%.             ED Course   Obtain Fetal nonstress test (NST)  Date/Time: 4/2/2019 4:52 PM  Performed by: Ashley Guzmán MD  Authorized by: Ashley Guzmán MD     Nonstress Test:     Variability:  6-25 BPM    Decelerations:  None    Accelerations:  15 bpm    Baseline:  145    Contractions:  Regular    Contraction Frequency:  1-2  Biophysical Profile:     Nonstress Test Interpretation: reactive      Overall Impression:  Reassuring      Labs Reviewed - No data to display       Imaging Results    None          Medical Decision Making:   ED Management:  SVE 4/60/-3  Having regular contractions although not feeling them painfully  Will recheck in 1 hour--> SVE at recheck unchanged.               Attending Attestation:   Physician Attestation Statement for Resident:  As the supervising MD   Physician Attestation Statement: I have personally seen and examined this patient.   I agree with the above history. -:   As the supervising MD I agree with the above PE.    As the supervising MD I agree with the above treatment, course, plan, and disposition.  I was personally present during the critical portions of the procedure(s) performed by the resident and was immediately available in the ED  to provide services and assistance as needed during the entire procedure.  I have reviewed and agree with the residents interpretation of the following: rhythm strips.  I have reviewed the following: old records at this facility.                    ED Course as of Apr 15 0845   Tue Apr 02, 2019   1700 I evaluated Ms. Day and discussed plan with Dr. Guzmán.  Pt presents with contractions at 37 weeks.  Not painful, but found to be 5cm in clinic.  She has a h/o fast delivery.  Will watch until 2h after clinic visit, d/c home if no change - bus she is having very regular contractions.  Not painful, pt would desire discharge    [HU]      ED Course User Index  [HU] Pinky Leon DO     Clinical Impression:       ICD-10-CM ICD-9-CM   1. Uterine contractions during pregnancy O62.2 661.20   2. 37 weeks gestation of pregnancy Z3A.37 V22.2   3. No prenatal care in current pregnancy in third trimester O09.33 V23.7         Disposition:   Disposition: Discharged  Condition: Stable                        Ashley Guzmán MD  Resident  04/02/19 1759       Pinky Leon DO  04/15/19 4906

## 2019-04-02 NOTE — PROGRESS NOTES
Complaints today: painful ctx      Pt has had no PNC this pregnancy. Was seen in the [facility] on 3/30 for pelvic pressure, found to be 3/50/-3. Pt had been seen in the main ED for vaginal bleeding earlier in the pregnancy and found to be 18 weeks by US. Fetal biometry performed in [facility] consistent with dates determined from 18w US.   Pt reports painful ctx, unsure of duration or how often they are occurring. Reports they started earlier this morning. Denies VB, LOF. Normal FM.     /88   Wt 73.8 kg (162 lb 11.2 oz)   LMP 2018 (Approximate)   BMI 26.26 kg/m²     28 y.o., at 37w2d by Estimated Date of Delivery: 19  Patient Active Problem List   Diagnosis    Pregnancy with one fetus in third trimester    Late prenatal care    Iron deficiency anemia     OB History    Para Term  AB Living   6 4 4 0 1 3   SAB TAB Ectopic Multiple Live Births   1 0 0 0 4      # Outcome Date GA Lbr Leif/2nd Weight Sex Delivery Anes PTL Lv   6 Current            5 Term 18 38w4d  3.374 kg (7 lb 7 oz) F Vag-Spont Spinal, EPI N WESTON   4 Term 16 40w2d  3.74 kg (8 lb 3.9 oz) M Vag-Spont EPI N WESTON   3 Term 10/26/14 39w2d / 00:19 2.801 kg (6 lb 2.8 oz) F Vag-Spont EPI N DEC   2 SAB 13           1 Term 12 39w0d  3.317 kg (7 lb 5 oz) F Vag-Spont EPI  WESTON       Dating reviewed    Allergies and problem list reviewed and updated    Medical and surgical history reviewed    Prenatal labs reviewed and updated    Physical Exam:  ABD: soft, gravid, nontender  SVE: -/-3    Assessment:  Pt is a 28 y.o.  at 37w2d who presents for routine OB follow up    Plan:   1. Pregnancy with one fetus in third trimester  - pt with regular, painful ctx q5 mins while in clinic  - SVE: /-3  - will send to [facility] for labor rule out    2. Late prenatal care        Tram Almonte MD   OBGYN, PGY-1

## 2019-04-09 ENCOUNTER — ROUTINE PRENATAL (OUTPATIENT)
Dept: OBSTETRICS AND GYNECOLOGY | Facility: CLINIC | Age: 29
End: 2019-04-09
Payer: MEDICAID

## 2019-04-09 VITALS
SYSTOLIC BLOOD PRESSURE: 134 MMHG | BODY MASS INDEX: 26.44 KG/M2 | DIASTOLIC BLOOD PRESSURE: 84 MMHG | WEIGHT: 163.81 LBS

## 2019-04-09 DIAGNOSIS — Z34.83 ENCOUNTER FOR SUPERVISION OF OTHER NORMAL PREGNANCY IN THIRD TRIMESTER: Primary | ICD-10-CM

## 2019-04-09 PROCEDURE — 99999 PR PBB SHADOW E&M-EST. PATIENT-LVL II: CPT | Mod: PBBFAC,,, | Performed by: ADVANCED PRACTICE MIDWIFE

## 2019-04-09 PROCEDURE — 99213 PR OFFICE/OUTPT VISIT, EST, LEVL III, 20-29 MIN: ICD-10-PCS | Mod: TH,S$PBB,, | Performed by: ADVANCED PRACTICE MIDWIFE

## 2019-04-09 PROCEDURE — 99213 OFFICE O/P EST LOW 20 MIN: CPT | Mod: TH,S$PBB,, | Performed by: ADVANCED PRACTICE MIDWIFE

## 2019-04-09 PROCEDURE — 99999 PR PBB SHADOW E&M-EST. PATIENT-LVL II: ICD-10-PCS | Mod: PBBFAC,,, | Performed by: ADVANCED PRACTICE MIDWIFE

## 2019-04-09 PROCEDURE — 99212 OFFICE O/P EST SF 10 MIN: CPT | Mod: PBBFAC,TH,PO | Performed by: ADVANCED PRACTICE MIDWIFE

## 2019-04-09 NOTE — PROGRESS NOTES
Chief Complaint   Patient presents with    Routine Prenatal Visit       28 y.o., at 38w2d by Estimated Date of Delivery: 19    Complaints today: None    ROS  OBSTETRICS:   Contractions Reports irregular   Bleeding denies   Loss of fluid denies   Fetal mvmnt Reports good FM, reinforced BID  GASTRO:   Nausea none   Vomiting none      OB History    Para Term  AB Living   6 4 4 0 1 3   SAB TAB Ectopic Multiple Live Births   1 0 0 0 4      # Outcome Date GA Lbr Leif/2nd Weight Sex Delivery Anes PTL Lv   6 Current            5 Term 18 38w4d  3.374 kg (7 lb 7 oz) F Vag-Spont Spinal, EPI N WESTON   4 Term 16 40w2d  3.74 kg (8 lb 3.9 oz) M Vag-Spont EPI N WESTON   3 Term 10/26/14 39w2d / 00:19 2.801 kg (6 lb 2.8 oz) F Vag-Spont EPI N DEC   2 SAB 13           1 Term 12 39w0d  3.317 kg (7 lb 5 oz) F Vag-Spont EPI  WESTON       Dating reviewed  Allergies and problem list reviewed and updated  Medical and surgical history reviewed  Prenatal labs reviewed and updated    PHYSICAL EXAM  /84   Wt 74.3 kg (163 lb 12.8 oz)   LMP 2018 (Approximate)   BMI 26.44 kg/m²     GENERAL: No acute distress  NEURO: Alert and oriented x3  PSYCH: Normal mood and affect  PULMONARY: Non-labored respiration  ABDomen: Soft, gravid, nontender    ASSESSMENT AND PLAN    2019 Problems (from 19 to present)     No problems associated with this episode.              Reviewed s/s active labor, rom, bleeding and if occur report to L&D.  Follow-up: 1week- will consider IOL p 39 weeks

## 2019-04-10 ENCOUNTER — TELEPHONE (OUTPATIENT)
Dept: OBSTETRICS AND GYNECOLOGY | Facility: CLINIC | Age: 29
End: 2019-04-10

## 2019-04-14 ENCOUNTER — HOSPITAL ENCOUNTER (INPATIENT)
Facility: OTHER | Age: 29
LOS: 2 days | Discharge: HOME | End: 2019-04-16
Attending: OBSTETRICS & GYNECOLOGY | Admitting: OBSTETRICS & GYNECOLOGY
Payer: MEDICAID

## 2019-04-14 ENCOUNTER — ANESTHESIA EVENT (OUTPATIENT)
Dept: OBSTETRICS AND GYNECOLOGY | Facility: OTHER | Age: 29
End: 2019-04-14

## 2019-04-14 ENCOUNTER — ANESTHESIA (OUTPATIENT)
Dept: OBSTETRICS AND GYNECOLOGY | Facility: OTHER | Age: 29
End: 2019-04-14

## 2019-04-14 LAB
ABO + RH BLD: NORMAL
BASOPHILS # BLD AUTO: 0.02 K/UL (ref 0–0.2)
BASOPHILS NFR BLD: 0.1 % (ref 0–1.9)
BLD GP AB SCN CELLS X3 SERPL QL: NORMAL
DIFFERENTIAL METHOD: ABNORMAL
EOSINOPHIL # BLD AUTO: 0 K/UL (ref 0–0.5)
EOSINOPHIL NFR BLD: 0.2 % (ref 0–8)
ERYTHROCYTE [DISTWIDTH] IN BLOOD BY AUTOMATED COUNT: 16.7 % (ref 11.5–14.5)
HCT VFR BLD AUTO: 31.5 % (ref 37–48.5)
HGB BLD-MCNC: 10 G/DL (ref 12–16)
LYMPHOCYTES # BLD AUTO: 4 K/UL (ref 1–4.8)
LYMPHOCYTES NFR BLD: 27.6 % (ref 18–48)
MCH RBC QN AUTO: 22.9 PG (ref 27–31)
MCHC RBC AUTO-ENTMCNC: 31.7 G/DL (ref 32–36)
MCV RBC AUTO: 72 FL (ref 82–98)
MONOCYTES # BLD AUTO: 0.9 K/UL (ref 0.3–1)
MONOCYTES NFR BLD: 6.2 % (ref 4–15)
NEUTROPHILS # BLD AUTO: 9.4 K/UL (ref 1.8–7.7)
NEUTROPHILS NFR BLD: 65.9 % (ref 38–73)
PLATELET # BLD AUTO: 355 K/UL (ref 150–350)
PMV BLD AUTO: 10.5 FL (ref 9.2–12.9)
RBC # BLD AUTO: 4.37 M/UL (ref 4–5.4)
WBC # BLD AUTO: 14.3 K/UL (ref 3.9–12.7)

## 2019-04-14 PROCEDURE — 86901 BLOOD TYPING SEROLOGIC RH(D): CPT

## 2019-04-14 PROCEDURE — 59409 OBSTETRICAL CARE: CPT | Mod: GB,,, | Performed by: OBSTETRICS & GYNECOLOGY

## 2019-04-14 PROCEDURE — 36415 COLL VENOUS BLD VENIPUNCTURE: CPT

## 2019-04-14 PROCEDURE — 11000001 HC ACUTE MED/SURG PRIVATE ROOM

## 2019-04-14 PROCEDURE — 51702 INSERT TEMP BLADDER CATH: CPT

## 2019-04-14 PROCEDURE — 59409 PR OBSTETRICAL CARE,VAG DELIV ONLY: ICD-10-PCS | Mod: GB,,, | Performed by: OBSTETRICS & GYNECOLOGY

## 2019-04-14 PROCEDURE — 85025 COMPLETE CBC W/AUTO DIFF WBC: CPT

## 2019-04-14 PROCEDURE — 72100002 HC LABOR CARE, 1ST 8 HOURS

## 2019-04-14 PROCEDURE — 72200005 HC VAGINAL DELIVERY LEVEL II

## 2019-04-14 PROCEDURE — 63600175 PHARM REV CODE 636 W HCPCS: Performed by: OBSTETRICS & GYNECOLOGY

## 2019-04-14 PROCEDURE — 72100003 HC LABOR CARE, EA. ADDL. 8 HRS

## 2019-04-14 RX ORDER — DIPHENHYDRAMINE HYDROCHLORIDE 50 MG/ML
25 INJECTION INTRAMUSCULAR; INTRAVENOUS EVERY 4 HOURS PRN
Status: DISCONTINUED | OUTPATIENT
Start: 2019-04-14 | End: 2019-04-16 | Stop reason: HOSPADM

## 2019-04-14 RX ORDER — HYDROCODONE BITARTRATE AND ACETAMINOPHEN 5; 325 MG/1; MG/1
1 TABLET ORAL EVERY 4 HOURS PRN
Status: DISCONTINUED | OUTPATIENT
Start: 2019-04-14 | End: 2019-04-16 | Stop reason: HOSPADM

## 2019-04-14 RX ORDER — IBUPROFEN 600 MG/1
600 TABLET ORAL EVERY 6 HOURS
Status: DISCONTINUED | OUTPATIENT
Start: 2019-04-15 | End: 2019-04-16 | Stop reason: HOSPADM

## 2019-04-14 RX ORDER — OXYTOCIN/RINGER'S LACTATE 20/1000 ML
41.65 PLASTIC BAG, INJECTION (ML) INTRAVENOUS CONTINUOUS
Status: ACTIVE | OUTPATIENT
Start: 2019-04-14 | End: 2019-04-15

## 2019-04-14 RX ORDER — DOCUSATE SODIUM 100 MG/1
200 CAPSULE, LIQUID FILLED ORAL 2 TIMES DAILY PRN
Status: DISCONTINUED | OUTPATIENT
Start: 2019-04-14 | End: 2019-04-15

## 2019-04-14 RX ORDER — ONDANSETRON 8 MG/1
8 TABLET, ORALLY DISINTEGRATING ORAL EVERY 8 HOURS PRN
Status: DISCONTINUED | OUTPATIENT
Start: 2019-04-14 | End: 2019-04-16 | Stop reason: HOSPADM

## 2019-04-14 RX ORDER — DIPHENHYDRAMINE HCL 25 MG
25 CAPSULE ORAL EVERY 4 HOURS PRN
Status: DISCONTINUED | OUTPATIENT
Start: 2019-04-14 | End: 2019-04-16 | Stop reason: HOSPADM

## 2019-04-14 RX ORDER — HYDROCODONE BITARTRATE AND ACETAMINOPHEN 10; 325 MG/1; MG/1
1 TABLET ORAL EVERY 4 HOURS PRN
Status: DISCONTINUED | OUTPATIENT
Start: 2019-04-14 | End: 2019-04-16 | Stop reason: HOSPADM

## 2019-04-14 RX ORDER — HYDROCORTISONE 25 MG/G
CREAM TOPICAL 3 TIMES DAILY PRN
Status: DISCONTINUED | OUTPATIENT
Start: 2019-04-14 | End: 2019-04-16 | Stop reason: HOSPADM

## 2019-04-14 RX ORDER — ACETAMINOPHEN 325 MG/1
650 TABLET ORAL EVERY 6 HOURS PRN
Status: DISCONTINUED | OUTPATIENT
Start: 2019-04-14 | End: 2019-04-16 | Stop reason: HOSPADM

## 2019-04-14 RX ADMIN — IBUPROFEN 600 MG: 600 TABLET ORAL at 11:04

## 2019-04-14 RX ADMIN — Medication 333 MILLI-UNITS/MIN: at 05:04

## 2019-04-14 NOTE — L&D DELIVERY NOTE
[facility]  Vaginal Delivery   Operative Note    SUMMARY     Patient called out with increased pelvic pressure and water bag was found to be on the bed, unruptured. I ruptured the bag noting clear fluid. Cervical check was 10/100/+2. The patient was set up for delivery and the infant delivered in 1 push.     Normal spontaneous vaginal delivery of live infant, was placed on mothers abdomen for skin to skin and bulb suctioning performed.  Infant delivered position OA over intact perineum.  Nuchal cord: No.    Spontaneous delivery of placenta and IV pitocin given noting good uterine tone.  1st degree laceration was noted and repaired with a figure-of-eight stitch of 2-0 vicryl. Good hemostasis was noted.  Patient tolerated delivery well. Sponge needle and lap counted correctly x2.    Indications: <principal problem not specified>  Pregnancy complicated by:   Patient Active Problem List   Diagnosis    Pregnancy with one fetus in third trimester    Late prenatal care    Iron deficiency anemia     (spontaneous vaginal delivery)     Admitting GA: 39w0d    Delivery Information for  Misbah Day    Birth information:  YOB: 2017   Time of birth: 5:51 PM   Sex: male   Head Delivery Date/Time: 2019  5:51 PM   Delivery type: Vaginal, Spontaneous   Gestational Age: 39w0d    Delivery Providers    Delivering clinician:  Yulisa Pereira MD   Provider Role    Keke Do MD Resident    Mary Ann Gage MD Resident    Martha Lopez, RN Delivery Nurse    She Tomlinson, RN Registered Nurse    Casi Silverio, MARCELINO Surgical Tech            Measurements    Weight:  3410 g  Length:  51.4 cm  Head circumference:  33 cm  Chest circumference:  34.3 cm         Apgars    Living status:  Living  Apgars:   1 min.:   5 min.:   10 min.:   15 min.:   20 min.:     Skin color:   1  1       Heart rate:   2  2       Reflex irritability:   2  2       Muscle tone:   2  2        Respiratory effort:   2  2       Total:   9  9       Apgars assigned by:  MIGNON FAGAN RN         Operative Delivery    Forceps attempted?:  No  Vacuum extractor attempted?:  No         Shoulder Dystocia    Shoulder dystocia present?:  No           Presentation    Presentation:  Vertex  Position:  Left Occiput Anterior           Interventions/Resuscitation    Method:  Bulb Suctioning, Tactile Stimulation       Cord    Vessels:  3 vessels  Complications:  None  Delayed Cord Clamping?:  Yes  Cord Clamped Date/Time:  2019  5:53 PM  Cord Blood Disposition:  Sent with Baby  Gases Sent?:  No  Stem Cell Collection (by MD):  No       Placenta    Placenta delivery date/time:  2019 175  Placenta removal:  Spontaneous  Placenta appearance:  Intact  Placenta disposition:  discarded           Labor Events:       labor: No     Labor Onset Date/Time:         Dilation Complete Date/Time: 2019 17:45     Start Pushing Date/Time: 2019 17:49     Rupture Date/Time: 19  1748         Rupture type:           Fluid Amount:        Fluid Color:        Fluid Odor:        Membrane Status (PeriCalm):        Rupture Date/Time (PeriCalm):        Fluid Amount (PeriCalm):        Fluid Color (PeriCalm):         steroids:       Antibiotics given for GBS:       Induction:       Indications for induction:        Augmentation:       Indications for augmentation:       Labor complications: None     Additional complications:          Cervical ripening:                     Delivery:      Episiotomy: None     Indication for Episiotomy:       Perineal Lacerations: 1st Repaired:  Yes   Periurethral Laceration:   Repaired:     Labial Laceration:   Repaired:     Sulcus Laceration:   Repaired:     Vaginal Laceration:   Repaired:     Cervical Laceration:   Repaired:     Repair suture:       Repair # of packets: 1     Last Value - EBL - Nursing (mL): 100     Sum - EBL - Nursing (mL): 100     Last Value - EBL -  Anesthesia (mL):      Calculated QBL (mL):        Vaginal Sweep Performed: Yes     Surgicount Correct: Yes       Other providers:       Anesthesia    Method:  Epidural          Details (if applicable):  Trial of Labor      Categorization:      Priority:     Indications for :     Incision Type:       Additional  information:  Forceps:    Vacuum:    Breech:    Observed anomalies    Other (Comments):         Mary Ann Gage MD  OBGYN, PGY-1

## 2019-04-15 LAB
BASOPHILS # BLD AUTO: 0.02 K/UL (ref 0–0.2)
BASOPHILS NFR BLD: 0.1 % (ref 0–1.9)
DIFFERENTIAL METHOD: ABNORMAL
EOSINOPHIL # BLD AUTO: 0.1 K/UL (ref 0–0.5)
EOSINOPHIL NFR BLD: 0.4 % (ref 0–8)
ERYTHROCYTE [DISTWIDTH] IN BLOOD BY AUTOMATED COUNT: 16.7 % (ref 11.5–14.5)
HCT VFR BLD AUTO: 25.4 % (ref 37–48.5)
HGB BLD-MCNC: 8 G/DL (ref 12–16)
LYMPHOCYTES # BLD AUTO: 2.7 K/UL (ref 1–4.8)
LYMPHOCYTES NFR BLD: 19.9 % (ref 18–48)
MCH RBC QN AUTO: 22.7 PG (ref 27–31)
MCHC RBC AUTO-ENTMCNC: 31.5 G/DL (ref 32–36)
MCV RBC AUTO: 72 FL (ref 82–98)
MONOCYTES # BLD AUTO: 1.1 K/UL (ref 0.3–1)
MONOCYTES NFR BLD: 7.8 % (ref 4–15)
NEUTROPHILS # BLD AUTO: 9.7 K/UL (ref 1.8–7.7)
NEUTROPHILS NFR BLD: 71.5 % (ref 38–73)
PLATELET # BLD AUTO: 270 K/UL (ref 150–350)
PMV BLD AUTO: 10 FL (ref 9.2–12.9)
RBC # BLD AUTO: 3.52 M/UL (ref 4–5.4)
WBC # BLD AUTO: 13.64 K/UL (ref 3.9–12.7)

## 2019-04-15 PROCEDURE — 25000003 PHARM REV CODE 250: Performed by: STUDENT IN AN ORGANIZED HEALTH CARE EDUCATION/TRAINING PROGRAM

## 2019-04-15 PROCEDURE — 11000001 HC ACUTE MED/SURG PRIVATE ROOM

## 2019-04-15 PROCEDURE — 99232 PR SUBSEQUENT HOSPITAL CARE,LEVL II: ICD-10-PCS | Mod: ,,, | Performed by: OBSTETRICS & GYNECOLOGY

## 2019-04-15 PROCEDURE — 99232 SBSQ HOSP IP/OBS MODERATE 35: CPT | Mod: ,,, | Performed by: OBSTETRICS & GYNECOLOGY

## 2019-04-15 PROCEDURE — 36415 COLL VENOUS BLD VENIPUNCTURE: CPT

## 2019-04-15 PROCEDURE — 25000003 PHARM REV CODE 250: Performed by: OBSTETRICS & GYNECOLOGY

## 2019-04-15 PROCEDURE — 85025 COMPLETE CBC W/AUTO DIFF WBC: CPT

## 2019-04-15 RX ORDER — IBUPROFEN 600 MG/1
600 TABLET ORAL EVERY 6 HOURS
Qty: 30 TABLET | Refills: 0 | Status: SHIPPED | OUTPATIENT
Start: 2019-04-15

## 2019-04-15 RX ORDER — IRON POLYSACCHARIDE COMPLEX 150 MG
150 CAPSULE ORAL DAILY
Status: DISCONTINUED | OUTPATIENT
Start: 2019-04-15 | End: 2019-04-16 | Stop reason: HOSPADM

## 2019-04-15 RX ORDER — DOCUSATE SODIUM 100 MG/1
200 CAPSULE, LIQUID FILLED ORAL 2 TIMES DAILY
Status: DISCONTINUED | OUTPATIENT
Start: 2019-04-15 | End: 2019-04-16 | Stop reason: HOSPADM

## 2019-04-15 RX ADMIN — DOCUSATE SODIUM 200 MG: 100 CAPSULE, LIQUID FILLED ORAL at 08:04

## 2019-04-15 RX ADMIN — IBUPROFEN 600 MG: 600 TABLET ORAL at 06:04

## 2019-04-15 RX ADMIN — Medication 150 MG: at 08:04

## 2019-04-15 RX ADMIN — HYDROCORTISONE 2.5%: 25 CREAM TOPICAL at 10:04

## 2019-04-15 RX ADMIN — DOCUSATE SODIUM 200 MG: 100 CAPSULE, LIQUID FILLED ORAL at 10:04

## 2019-04-15 RX ADMIN — IBUPROFEN 600 MG: 600 TABLET ORAL at 05:04

## 2019-04-15 RX ADMIN — IBUPROFEN 600 MG: 600 TABLET ORAL at 11:04

## 2019-04-15 NOTE — PROGRESS NOTES
Pt asked for IV to come out. Advised pt that if need for IV access would arise, would have to be re-stuck. Pt verbalized understanding. IV d/c, catheter intact.

## 2019-04-15 NOTE — PROGRESS NOTES
Instructed on the risks of formula feeding including:  · Lacks the nutrients found in colostrums to help prevent infection, mature the gut, aid in digestion and resist allergies  · Contains artificial additives and preservatives which increases incidence of contamination  · Increase spitting up due to slower digestion  · Increased cost and requires preparation, including bottle sanitation and formula refrigeration  · Increased incidence of NEC for the  baby  · Increased risk of diabetes with family history, SIDS and ear infections  · Skipped feedings for the breastfeeding mother increases chance of engorgement, mastitis and plugged ducts  · Decreases breastfeeding babys appetite resulting in poor feeding session, decreased breast stimulation and poor milk supply  · Exposes the breastfeeding baby to the possibility of allergic reactions and colic  Pt states understanding and verbalized appropriate recall.

## 2019-04-15 NOTE — H&P
H&P hand written during Epic downtime. To be scanned into chart. Please refer to media    Tram Almonte MD   OBGYN, PGY-1

## 2019-04-15 NOTE — PROGRESS NOTES
POSTPARTUM PROGRESS NOTE     Lurdes Day is a 28 y.o. female PPD #1 status post Spontaneous vaginal delivery at 39w1d in a pregnancy complicated by lack of PNC. Patient is doing well this morning. She denies nausea, vomiting, fever or chills.  Patient reports mild abdominal pain that is adequately relieved by oral pain medications. Lochia is mild and stable. Patient is voiding without difficulty and ambulating with no difficulty. She has passed flatus, and has not had BM.  Patient does plan to breast feed. Desires depo for contraception. She desires circumcision.     Objective:       Temp:  [97.6 °F (36.4 °C)-98.5 °F (36.9 °C)] 98.5 °F (36.9 °C)  Pulse:  [63-84] 81  Resp:  [16-19] 18  SpO2:  [99 %-100 %] 99 %  BP: (119-143)/(77-91) 120/77    General:   alert, appears stated age and cooperative   Lungs:   clear to auscultation bilaterally   Heart:   regular rate and rhythm, S1, S2 normal, no murmur, click, rub or gallop   Abdomen:  soft, non-tender; bowel sounds normal; no masses,  no organomegaly   Uterus:  firm located at the umblicus.    Extremities: peripheral pulses normal, no pedal edema, no clubbing or cyanosis     Lab Review  Recent Results (from the past 4 hour(s))   CBC auto differential    Collection Time: 04/15/19  4:49 AM   Result Value Ref Range    WBC 13.64 (H) 3.90 - 12.70 K/uL    RBC 3.52 (L) 4.00 - 5.40 M/uL    Hemoglobin 8.0 (L) 12.0 - 16.0 g/dL    Hematocrit 25.4 (L) 37.0 - 48.5 %    MCV 72 (L) 82 - 98 fL    MCH 22.7 (L) 27.0 - 31.0 pg    MCHC 31.5 (L) 32.0 - 36.0 g/dL    RDW 16.7 (H) 11.5 - 14.5 %    Platelets 270 150 - 350 K/uL    MPV 10.0 9.2 - 12.9 fL    Gran # (ANC) 9.7 (H) 1.8 - 7.7 K/uL    Lymph # 2.7 1.0 - 4.8 K/uL    Mono # 1.1 (H) 0.3 - 1.0 K/uL    Eos # 0.1 0.0 - 0.5 K/uL    Baso # 0.02 0.00 - 0.20 K/uL    Gran% 71.5 38.0 - 73.0 %    Lymph% 19.9 18.0 - 48.0 %    Mono% 7.8 4.0 - 15.0 %    Eosinophil% 0.4 0.0 - 8.0 %    Basophil% 0.1 0.0 - 1.9 %    Differential Method Automated         I/O    Intake/Output Summary (Last 24 hours) at 4/15/2019 0704  Last data filed at 4/15/2019 0600  Gross per 24 hour   Intake --   Output 1050 ml   Net -1050 ml        Assessment:     Patient Active Problem List   Diagnosis    Pregnancy with one fetus in third trimester    Late prenatal care    Iron deficiency anemia     (spontaneous vaginal delivery)        Plan:   1. Postpartum care:  - Patient doing well. Continue routine management and advances.  - Continue PO pain meds. Pain well controlled.  - Encourage ambulation  - Circumcision consents signed  - Contraception, desires depo provera prior to discharge  - Lactation consult PRN    2. Acute blood loss anemia  - Heme: H/h 10.0/31.5 > 8.0/25.4  - VSS, asymptomatic  - fe/colace      Dispo: As patient meets milestones, will plan to discharge PPD#2.      Tram Almonte MD   OBGYN, PGY-1

## 2019-04-15 NOTE — PROGRESS NOTES
MD to bedside to evaluate blood clot. RN called stating that the clot was harder than normal and appeared fibrous. On exam, no membranes or placental tissue noted within the clot. Fundus firm, VSS. Continue to monitor.    Constanza London MD  OBGYN PGY-1

## 2019-04-16 VITALS
SYSTOLIC BLOOD PRESSURE: 117 MMHG | BODY MASS INDEX: 26.33 KG/M2 | WEIGHT: 163.81 LBS | TEMPERATURE: 99 F | DIASTOLIC BLOOD PRESSURE: 75 MMHG | HEART RATE: 79 BPM | RESPIRATION RATE: 18 BRPM | OXYGEN SATURATION: 99 % | HEIGHT: 66 IN

## 2019-04-16 PROCEDURE — 99238 PR HOSPITAL DISCHARGE DAY,<30 MIN: ICD-10-PCS | Mod: ,,, | Performed by: OBSTETRICS & GYNECOLOGY

## 2019-04-16 PROCEDURE — 99238 HOSP IP/OBS DSCHRG MGMT 30/<: CPT | Mod: ,,, | Performed by: OBSTETRICS & GYNECOLOGY

## 2019-04-16 PROCEDURE — 25000003 PHARM REV CODE 250: Performed by: STUDENT IN AN ORGANIZED HEALTH CARE EDUCATION/TRAINING PROGRAM

## 2019-04-16 PROCEDURE — 25000003 PHARM REV CODE 250: Performed by: OBSTETRICS & GYNECOLOGY

## 2019-04-16 RX ADMIN — Medication 150 MG: at 09:04

## 2019-04-16 RX ADMIN — HYDROCODONE BITARTRATE AND ACETAMINOPHEN 1 TABLET: 5; 325 TABLET ORAL at 12:04

## 2019-04-16 RX ADMIN — IBUPROFEN 600 MG: 600 TABLET ORAL at 05:04

## 2019-04-16 RX ADMIN — DOCUSATE SODIUM 200 MG: 100 CAPSULE, LIQUID FILLED ORAL at 09:04

## 2019-04-16 NOTE — DISCHARGE SUMMARY
Delivery Discharge Summary  Obstetrics      Primary OB Clinician: Yulisa Pereira MD      Admission date: 2019  Discharge date: 2019    Disposition: To home, self care    Discharge Diagnosis List:      Patient Active Problem List   Diagnosis    Pregnancy with one fetus in third trimester    Late prenatal care    Iron deficiency anemia     (spontaneous vaginal delivery)       Procedure:     Hospital Course:  Lurdes Day is a 28 y.o. now , PPD #2 who was admitted on 2019 at 39w0d for labor. Patient was subsequently admitted to labor and delivery unit with signed consents.     Labor course was uncomplicated and resulted in  without complications.     Please see delivery note for further details. Her postpartum course was uncomplicated. On discharge day, patient's pain is controlled with oral pain medications. Pt is tolerating ambulation without SOB or CP, and regular diet without N/V. Reports lochia is mild. Denies any HA, vision changes, F/C, LE swelling. Denies any breast pain/soreness.    Pt in stable condition and ready for discharge. She has been instructed to start and/or continue medications and follow up with her obstetrics provider as listed below.    Pertinent studies:  CBC  Recent Labs   Lab 19  1550 04/15/19  0449   WBC 14.30* 13.64*   HGB 10.0* 8.0*   HCT 31.5* 25.4*   MCV 72* 72*   * 270          Immunization History   Administered Date(s) Administered    Tdap 10/03/2014, 2018        Delivery:    Episiotomy: None   Lacerations: 1st   Repair suture:     Repair # of packets: 1   Blood loss (ml): 100     Birth information:  YOB: 2017   Time of birth: 5:51 PM   Sex: male   Delivery type: Vaginal, Spontaneous   Gestational Age: 39w0d    Delivery Clinician:      Other providers:       Additional  information:  Forceps:    Vacuum:    Breech:    Observed anomalies      Living?:           APGARS  One minute Five minutes Ten minutes   Skin  color:         Heart rate:         Grimace:         Muscle tone:         Breathing:         Totals: 9  9        Placenta: Delivered:       appearance      Patient Instructions:   Current Discharge Medication List      START taking these medications    Details   ibuprofen (ADVIL,MOTRIN) 600 MG tablet Take 1 tablet (600 mg total) by mouth every 6 (six) hours.  Qty: 30 tablet, Refills: 0             Discharge Procedure Orders   Diet Adult Regular     Other restrictions (specify):   Order Comments: Pelvic rest for at least 6 weeks. Nothing in vagina - no sex, tampons, or douching for 6 weeks     Notify your health care provider if you experience any of the following:   Order Comments: Heavy vaginal bleeding saturating more than 1 pad per hour for at least 2 hours.     Notify your health care provider if you experience any of the following:  increased confusion or weakness     Notify your health care provider if you experience any of the following:  persistent dizziness, light-headedness, or visual disturbances     Notify your health care provider if you experience any of the following:  severe persistent headache     Notify your health care provider if you experience any of the following:  difficulty breathing or increased cough     Notify your health care provider if you experience any of the following:  severe uncontrolled pain     Notify your health care provider if you experience any of the following:  persistent nausea and vomiting or diarrhea     Notify your health care provider if you experience any of the following:  temperature >100.4     Activity as tolerated       Follow-up Information     [facility]. Schedule an appointment as soon as possible for a visit in 6 weeks.    Specialty:  Obstetrics and Gynecology  Why:  Postpartum visit  Contact information:  [address] [facility] [address] [phone]                  Tram Almonte MD   OBGYN, PGY-1    Doing well, no  complaints, ready for D/C.

## 2019-04-16 NOTE — PROGRESS NOTES
POSTPARTUM PROGRESS NOTE     Lurdes Day is a 28 y.o. female PPD #2 status post Spontaneous vaginal delivery at 39w1d in a pregnancy complicated by lack of PNC. Patient is doing well this morning. She denies nausea, vomiting, fever or chills.  Patient reports mild abdominal pain that is adequately relieved by oral pain medications. Lochia is mild and stable. Patient is voiding without difficulty and ambulating with no difficulty. She has passed flatus, and has not had BM.  Patient does plan to breast feed. Pt has changed her mind regarding contraception. She no longer desires depo, would like to use the patch. She desires circumcision.     Objective:       Temp:  [98.3 °F (36.8 °C)-98.8 °F (37.1 °C)] 98.8 °F (37.1 °C)  Pulse:  [76-87] 80  Resp:  [18] 18  SpO2:  [99 %] 99 %  BP: (111-122)/(74-87) 119/86    General:   alert, appears stated age and cooperative   Lungs:   clear to auscultation bilaterally   Heart:   regular rate and rhythm, S1, S2 normal, no murmur, click, rub or gallop   Abdomen:  soft, non-tender; bowel sounds normal; no masses,  no organomegaly   Uterus:  firm located at the umblicus.    Extremities: peripheral pulses normal, no pedal edema, no clubbing or cyanosis     Lab Review  No results found for this or any previous visit (from the past 4 hour(s)).    I/O  No intake or output data in the 24 hours ending 19 0656     Assessment:     Patient Active Problem List   Diagnosis    Pregnancy with one fetus in third trimester    Late prenatal care    Iron deficiency anemia     (spontaneous vaginal delivery)        Plan:   1. Postpartum care:  - Patient doing well. Continue routine management and advances.  - Continue PO pain meds. Pain well controlled.  - Encourage ambulation  - Circumcision consents signed  - Contraception, desires the patch; explained estrogen and progesterone containing birth control methods are not used it the immediate postpartum period secondary to increase risk of  clot; pt would like an Rx for the patch when she comes in for the 6w post partum visit; discussed other long term options for birth control including IUD and nexplanon; pt will consider her options  - Lactation consult PRN    2. Acute blood loss anemia  - Heme: H/h 10.0/31.5 > 8.0/25.4  - VSS, asymptomatic  - fe/colace      Dispo: As patient meets milestones, will plan to discharge today      Tram Almonte MD   OBGYN, PGY-1    Doing well, ,no problems.  I have reviewed the resident's note, and agree with the diagnosis and management plan

## 2019-04-16 NOTE — PLAN OF CARE
Problem: Adult Inpatient Plan of Care  Goal: Plan of Care Review  Outcome: Outcome(s) achieved Date Met: 04/16/19  Pt ambulating, voiding, and passing flatus. Pt tolerating PO well and there is no SS of distress at the time. Pts pain well controlled throughout shift by oral pain medication. Pt's bleeding has been light throughout shift and fundus is firm.  Mother baby care guide reviewed. All questions answered. Pt verbalized understanding to follow up with provider in 6 weeks at [facility]. Reviewed s/s to call provider for. Sent home with ibuprofen prescription. Id band verified. Pt stable at this time.

## 2019-04-22 ENCOUNTER — TELEPHONE (OUTPATIENT)
Dept: OBSTETRICS AND GYNECOLOGY | Facility: OTHER | Age: 29
End: 2019-04-22

## 2019-04-22 NOTE — TELEPHONE ENCOUNTER
The patient states she is doing well since being discharged home. She states she is currently at the doctor for a check up appointment for her child so now is not a great time to talk. I informed the patient to call her doctor if she has any questions or concerns and she states understanding.

## 2019-06-13 PROBLEM — O09.30 LATE PRENATAL CARE: Status: RESOLVED | Noted: 2018-01-26 | Resolved: 2019-06-13

## 2019-06-13 PROBLEM — D50.9 IRON DEFICIENCY ANEMIA: Status: RESOLVED | Noted: 2018-03-06 | Resolved: 2019-06-13

## 2019-06-27 ENCOUNTER — POSTPARTUM VISIT (OUTPATIENT)
Dept: OBSTETRICS AND GYNECOLOGY | Facility: CLINIC | Age: 29
End: 2019-06-27
Payer: MEDICAID

## 2019-06-27 VITALS
SYSTOLIC BLOOD PRESSURE: 106 MMHG | DIASTOLIC BLOOD PRESSURE: 72 MMHG | HEIGHT: 66 IN | BODY MASS INDEX: 22.85 KG/M2 | WEIGHT: 142.19 LBS

## 2019-06-27 DIAGNOSIS — R35.0 INCREASED FREQUENCY OF URINATION: ICD-10-CM

## 2019-06-27 DIAGNOSIS — Z30.011 ENCOUNTER FOR INITIAL PRESCRIPTION OF CONTRACEPTIVE PILLS: ICD-10-CM

## 2019-06-27 DIAGNOSIS — Z30.09 ENCOUNTER FOR OTHER GENERAL COUNSELING OR ADVICE ON CONTRACEPTION: ICD-10-CM

## 2019-06-27 LAB
B-HCG UR QL: NEGATIVE
CTP QC/QA: YES

## 2019-06-27 PROCEDURE — 59430 PR CARE AFTER DELIVERY ONLY: ICD-10-PCS | Mod: ,,, | Performed by: OBSTETRICS & GYNECOLOGY

## 2019-06-27 PROCEDURE — 87086 URINE CULTURE/COLONY COUNT: CPT

## 2019-06-27 PROCEDURE — 99213 OFFICE O/P EST LOW 20 MIN: CPT | Mod: PBBFAC,PO | Performed by: OBSTETRICS & GYNECOLOGY

## 2019-06-27 PROCEDURE — 99999 PR PBB SHADOW E&M-EST. PATIENT-LVL III: CPT | Mod: PBBFAC,,, | Performed by: OBSTETRICS & GYNECOLOGY

## 2019-06-27 PROCEDURE — 99999 PR PBB SHADOW E&M-EST. PATIENT-LVL III: ICD-10-PCS | Mod: PBBFAC,,, | Performed by: OBSTETRICS & GYNECOLOGY

## 2019-06-27 PROCEDURE — 81025 URINE PREGNANCY TEST: CPT | Mod: PBBFAC,PO | Performed by: OBSTETRICS & GYNECOLOGY

## 2019-06-27 RX ORDER — NORGESTIMATE AND ETHINYL ESTRADIOL 0.25-0.035
1 KIT ORAL DAILY
Qty: 28 TABLET | Refills: 12 | Status: SHIPPED | OUTPATIENT
Start: 2019-06-27 | End: 2020-06-25

## 2019-06-27 NOTE — PROGRESS NOTES
Chief Complaint   Patient presents with    Postpartum Care       HPI:  28 y.o. female  presents for a post-partum check-up    Patient's last menstrual period was 2018 (approximate).    Delivery: , 2019  Hospitalization: UNCOMPLICATED  Ambulating, tolerating Po, moving bowels: YES  Pain controlled: YES  Bleeding: STOPPED  Breastfeeding: BOTTLE  Breast pain or engorgement: DENIES  Postpartum depression: NO MOOD ISSUES.  NO THOUGHTS OF HURTING HERSELF OR HER BABY.  Incision: N/A    - NOTES INCREASED URINARY FREQUENCY    Contraception: NONE  Pap: 2018, NILM    History reviewed. No pertinent past medical history.  Past Surgical History:   Procedure Laterality Date    HERNIA REPAIR         Social History     Tobacco Use    Smoking status: Never Smoker    Smokeless tobacco: Never Used   Substance Use Topics    Alcohol use: No     Family History   Problem Relation Age of Onset    Colon cancer Neg Hx     Hypertension Neg Hx      OB History    Para Term  AB Living   6 5 5 0 1 4   SAB TAB Ectopic Multiple Live Births   1 0 0 0 5      # Outcome Date GA Lbr Leif/2nd Weight Sex Delivery Anes PTL Lv   6 Term 19 39w0d / 00:06 3.41 kg (7 lb 8.3 oz) M Vag-Spont EPI N [place]   5 Term 18 38w4d  3.374 kg (7 lb 7 oz) F Vag-Spont Spinal, EPI N [place]   4 Term 16 40w2d  3.74 kg (8 lb 3.9 oz) M Vag-Spont EPI N [place]   3 Term 10/26/14 39w2d / 00:19 2.801 kg (6 lb 2.8 oz) F Vag-Spont EPI N DEC   2 SAB 13           1 Term 12 39w0d  3.317 kg (7 lb 5 oz) F Vag-Spont EPI  [place]       MEDICATIONS: Reviewed with patient.  ALLERGIES: Patient has no known allergies.     ROS:  Review of Systems   Constitutional: Negative for fever.   Respiratory: Negative for shortness of breath.    Cardiovascular: Negative for chest pain.   Gastrointestinal: Negative for abdominal pain, nausea and vomiting.   Genitourinary: Negative for menstrual problem.   Integumentary:  Negative for breast mass.  "  Neurological: Negative for headaches.   Psychiatric/Behavioral: Negative for depression.   Breast: Negative for mass and mastodynia      PHYSICAL EXAM:    /72   Ht 5' 6" (1.676 m)   Wt 64.5 kg (142 lb 3.2 oz)   LMP 08/21/2018 (Approximate)   BMI 22.95 kg/m²     Physical Exam:   Constitutional: She is oriented to person, place, and time. She appears well-developed.    HENT:   Head: Normocephalic.       Pulmonary/Chest: Effort normal.        Abdominal: She exhibits no mass. There is no hepatosplenomegaly. There is no tenderness. No hernia.     Genitourinary: Vagina normal. Uterus is not enlarged and not tender. Cervix is normal. Right adnexum displays no tenderness and no fullness. Left adnexum displays no tenderness and no fullness. No vaginal discharge found.   Genitourinary Comments: External genitalia: Normal  Urethra: No tenderness; normal meatus  Bladder: No tenderness               Neurological: She is alert and oriented to person, place, and time.     Psychiatric: She has a normal mood and affect.         ASSESSMENT & PLAN:   Encounter for routine postpartum follow-up    Encounter for other general counseling or advice on contraception  -     POCT urine pregnancy    Encounter for initial prescription of contraceptive pills  -     norgestimate-ethinyl estradiol (ORTHO-CYCLEN) 0.25-35 mg-mcg per tablet; Take 1 tablet by mouth once daily.  Dispense: 28 tablet; Refill: 12    Increased frequency of urination  -     Urine culture        - Doing well  - Exam: NORMAL  - Mood / depression screening: NO MOOD ISSUES  - Contraception: OCPs    Contraception counseling:  - Options including OCPs, transdermal patches, vaginal ring, DepoProvera injections, Nexplanon, and IUDs.  - Effectiveness, compliance issues, initiation, and bleeding profile of each method  - Warnings about anticipated minor side effects such as breakthrough spotting, nausea, breast tenderness, weight changes, acne, headaches, etc  - More " serious potential side effects (especially with combined hormonal contraceptive methods) such as MI, stroke, and deep vein thrombosis.  All of which are very unlikely.  - Instructed to report any signs of such serious problems immediately.  - Need for additional protection, such as a condom, to prevent exposure to sexually transmitted diseases    - Lactation referral: N/A    - CHECK URINE CULTURE    - Follow-up: 1 YEAR

## 2019-06-30 LAB — BACTERIA UR CULT: NORMAL

## 2020-10-13 NOTE — PROGRESS NOTES
Hpi Title: Evaluation of Skin Lesions Seen and examined.  Agree with note.  All questions answered     Have Your Spot(S) Been Treated In The Past?: has not been treated

## 2021-04-16 ENCOUNTER — PATIENT MESSAGE (OUTPATIENT)
Dept: RESEARCH | Facility: HOSPITAL | Age: 31
End: 2021-04-16

## 2022-05-09 ENCOUNTER — TELEPHONE (OUTPATIENT)
Dept: OPHTHALMOLOGY | Facility: CLINIC | Age: 32
End: 2022-05-09
Payer: MEDICAID
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
